# Patient Record
Sex: FEMALE | Race: WHITE | Employment: OTHER | URBAN - NONMETROPOLITAN AREA
[De-identification: names, ages, dates, MRNs, and addresses within clinical notes are randomized per-mention and may not be internally consistent; named-entity substitution may affect disease eponyms.]

---

## 2018-09-24 LAB
AVERAGE GLUCOSE: NORMAL
CHOLESTEROL, TOTAL: 210 MG/DL
CHOLESTEROL/HDL RATIO: 3.8
HBA1C MFR BLD: 6.1 %
HDLC SERPL-MCNC: 55 MG/DL (ref 35–70)
LDL CHOLESTEROL CALCULATED: 135 MG/DL (ref 0–160)
TRIGL SERPL-MCNC: 96 MG/DL
VLDLC SERPL CALC-MCNC: 156 MG/DL

## 2019-09-03 RX ORDER — MULTIVITAMIN
1 TABLET ORAL DAILY
COMMUNITY

## 2019-09-03 RX ORDER — TURMERIC ROOT EXTRACT 500 MG
1 TABLET ORAL DAILY
COMMUNITY

## 2019-09-03 RX ORDER — PIOGLITAZONEHYDROCHLORIDE 30 MG/1
30 TABLET ORAL DAILY
COMMUNITY
End: 2019-09-16 | Stop reason: SDUPTHER

## 2019-09-16 ENCOUNTER — OFFICE VISIT (OUTPATIENT)
Dept: FAMILY MEDICINE CLINIC | Age: 79
End: 2019-09-16
Payer: MEDICARE

## 2019-09-16 VITALS
HEART RATE: 88 BPM | BODY MASS INDEX: 39.34 KG/M2 | SYSTOLIC BLOOD PRESSURE: 128 MMHG | OXYGEN SATURATION: 96 % | HEIGHT: 63 IN | WEIGHT: 222 LBS | DIASTOLIC BLOOD PRESSURE: 74 MMHG

## 2019-09-16 DIAGNOSIS — Z12.39 SCREENING FOR BREAST CANCER: Primary | ICD-10-CM

## 2019-09-16 DIAGNOSIS — Z12.11 SCREEN FOR COLON CANCER: ICD-10-CM

## 2019-09-16 DIAGNOSIS — E11.9 TYPE 2 DIABETES MELLITUS WITHOUT COMPLICATION, WITHOUT LONG-TERM CURRENT USE OF INSULIN (HCC): ICD-10-CM

## 2019-09-16 DIAGNOSIS — M85.89 OSTEOPENIA OF MULTIPLE SITES: ICD-10-CM

## 2019-09-16 DIAGNOSIS — Z23 IMMUNIZATION DUE: ICD-10-CM

## 2019-09-16 DIAGNOSIS — E78.49 OTHER HYPERLIPIDEMIA: ICD-10-CM

## 2019-09-16 LAB
BILIRUBIN, POC: ABNORMAL
BLOOD URINE, POC: ABNORMAL
CLARITY, POC: CLEAR
COLOR, POC: YELLOW
GLUCOSE URINE, POC: ABNORMAL
KETONES, POC: ABNORMAL
LEUKOCYTE EST, POC: ABNORMAL
NITRITE, POC: POSITIVE
PH, POC: 6.5
PROTEIN, POC: ABNORMAL
SPECIFIC GRAVITY, POC: 1.02
UROBILINOGEN, POC: ABNORMAL

## 2019-09-16 PROCEDURE — 81002 URINALYSIS NONAUTO W/O SCOPE: CPT | Performed by: FAMILY MEDICINE

## 2019-09-16 PROCEDURE — 90653 IIV ADJUVANT VACCINE IM: CPT | Performed by: FAMILY MEDICINE

## 2019-09-16 PROCEDURE — 93000 ELECTROCARDIOGRAM COMPLETE: CPT | Performed by: FAMILY MEDICINE

## 2019-09-16 PROCEDURE — G0008 ADMIN INFLUENZA VIRUS VAC: HCPCS | Performed by: FAMILY MEDICINE

## 2019-09-16 PROCEDURE — 99214 OFFICE O/P EST MOD 30 MIN: CPT | Performed by: FAMILY MEDICINE

## 2019-09-16 RX ORDER — PIOGLITAZONEHYDROCHLORIDE 30 MG/1
30 TABLET ORAL DAILY
Qty: 90 TABLET | Refills: 3 | Status: SHIPPED
Start: 2019-09-16 | End: 2020-09-21 | Stop reason: SDUPTHER

## 2019-09-16 SDOH — HEALTH STABILITY: MENTAL HEALTH: HOW OFTEN DO YOU HAVE A DRINK CONTAINING ALCOHOL?: NEVER

## 2019-09-16 ASSESSMENT — ENCOUNTER SYMPTOMS
EYE REDNESS: 0
COUGH: 0
ABDOMINAL PAIN: 0
DIARRHEA: 0
EYES NEGATIVE: 1
PHOTOPHOBIA: 0
WHEEZING: 0
CHEST TIGHTNESS: 0
CONSTIPATION: 0
BLOOD IN STOOL: 0
VOMITING: 0

## 2019-09-16 ASSESSMENT — PATIENT HEALTH QUESTIONNAIRE - PHQ9
SUM OF ALL RESPONSES TO PHQ QUESTIONS 1-9: 0
SUM OF ALL RESPONSES TO PHQ9 QUESTIONS 1 & 2: 0
SUM OF ALL RESPONSES TO PHQ QUESTIONS 1-9: 0
1. LITTLE INTEREST OR PLEASURE IN DOING THINGS: 0
2. FEELING DOWN, DEPRESSED OR HOPELESS: 0

## 2019-09-16 NOTE — PROGRESS NOTES
OFFICE NOTE    9/16/19  Name: Baron Owusu  ZDX:[birth year]   Sex:female   Age:79 y.o. SUBJECTIVE  Chief Complaint   Patient presents with    Annual Exam    Diabetes       HPI Comes in for checkup. Spends 6 mos in [place]. C/o dependent edema, better in cooler weather    Review of Systems   Constitutional: Negative for appetite change, fever and unexpected weight change. HENT: Negative for congestion, ear pain and postnasal drip. Eyes: Negative. Negative for photophobia, redness and visual disturbance. Respiratory: Negative for cough, chest tightness and wheezing. Cardiovascular: Positive for leg swelling. Negative for chest pain and palpitations. Gastrointestinal: Negative for abdominal pain, blood in stool, constipation, diarrhea and vomiting. Endocrine: Negative for cold intolerance, polydipsia and polyuria. Genitourinary: Negative for dysuria and hematuria. Musculoskeletal: Positive for arthralgias. Negative for gait problem and joint swelling. Skin: Negative for rash and wound. Allergic/Immunologic: Negative for environmental allergies and food allergies. Neurological: Negative for dizziness, tremors, weakness and headaches. Hematological: Negative for adenopathy. Does not bruise/bleed easily. Psychiatric/Behavioral: Negative for behavioral problems, confusion, dysphoric mood and sleep disturbance. Current Outpatient Medications:     pioglitazone (ACTOS) 30 MG tablet, Take 1 tablet by mouth daily, Disp: 90 tablet, Rfl: 3    Calcium Carb-Cholecalciferol (CALCIUM + D3) 600-200 MG-UNIT TABS tablet, Take 1 tablet by mouth daily , Disp: , Rfl:     Multiple Vitamin (MULTI-VITAMIN DAILY) TABS, Take 1 tablet by mouth daily, Disp: , Rfl:     aspirin 81 MG tablet, Take 81 mg by mouth daily, Disp: , Rfl:     Turmeric 500 MG TABS, Take 1 tablet by mouth daily , Disp: , Rfl:   No Known Allergies    No past medical history on file. No past surgical history on file.   No

## 2019-09-27 ENCOUNTER — HOSPITAL ENCOUNTER (OUTPATIENT)
Age: 79
Discharge: HOME | End: 2019-09-29
Payer: MEDICARE

## 2019-09-27 DIAGNOSIS — E78.49 OTHER HYPERLIPIDEMIA: ICD-10-CM

## 2019-09-27 DIAGNOSIS — E11.9 TYPE 2 DIABETES MELLITUS WITHOUT COMPLICATION, WITHOUT LONG-TERM CURRENT USE OF INSULIN (HCC): ICD-10-CM

## 2019-09-27 LAB
ALBUMIN SERPL-MCNC: 3.8 G/DL (ref 3.5–5.2)
ALP BLD-CCNC: 104 U/L (ref 35–104)
ALT SERPL-CCNC: 22 U/L (ref 0–32)
ANION GAP SERPL CALCULATED.3IONS-SCNC: 17 MMOL/L (ref 7–16)
AST SERPL-CCNC: 25 U/L (ref 0–31)
BASOPHILS ABSOLUTE: 0.02 E9/L (ref 0–0.2)
BASOPHILS RELATIVE PERCENT: 0.5 % (ref 0–2)
BILIRUB SERPL-MCNC: 0.3 MG/DL (ref 0–1.2)
BUN BLDV-MCNC: 16 MG/DL (ref 8–23)
CALCIUM SERPL-MCNC: 8.9 MG/DL (ref 8.6–10.2)
CHLORIDE BLD-SCNC: 103 MMOL/L (ref 98–107)
CHOLESTEROL, TOTAL: 199 MG/DL (ref 0–199)
CO2: 22 MMOL/L (ref 22–29)
CREAT SERPL-MCNC: 0.8 MG/DL (ref 0.5–1)
CREATININE URINE: 81 MG/DL (ref 29–226)
EOSINOPHILS ABSOLUTE: 0.12 E9/L (ref 0.05–0.5)
EOSINOPHILS RELATIVE PERCENT: 3 % (ref 0–6)
GFR AFRICAN AMERICAN: >60
GFR NON-AFRICAN AMERICAN: >60 ML/MIN/1.73
GLUCOSE BLD-MCNC: 124 MG/DL (ref 74–99)
HCT VFR BLD CALC: 39.7 % (ref 34–48)
HDLC SERPL-MCNC: 53 MG/DL
HEMOGLOBIN: 12.6 G/DL (ref 11.5–15.5)
IMMATURE GRANULOCYTES #: 0.01 E9/L
IMMATURE GRANULOCYTES %: 0.2 % (ref 0–5)
LDL CHOLESTEROL CALCULATED: 128 MG/DL (ref 0–99)
LYMPHOCYTES ABSOLUTE: 1.66 E9/L (ref 1.5–4)
LYMPHOCYTES RELATIVE PERCENT: 40.9 % (ref 20–42)
MCH RBC QN AUTO: 30.5 PG (ref 26–35)
MCHC RBC AUTO-ENTMCNC: 31.7 % (ref 32–34.5)
MCV RBC AUTO: 96.1 FL (ref 80–99.9)
MICROALBUMIN UR-MCNC: <12 MG/L
MICROALBUMIN/CREAT UR-RTO: ABNORMAL (ref 0–30)
MONOCYTES ABSOLUTE: 0.28 E9/L (ref 0.1–0.95)
MONOCYTES RELATIVE PERCENT: 6.9 % (ref 2–12)
NEUTROPHILS ABSOLUTE: 1.97 E9/L (ref 1.8–7.3)
NEUTROPHILS RELATIVE PERCENT: 48.5 % (ref 43–80)
PDW BLD-RTO: 14.3 FL (ref 11.5–15)
PLATELET # BLD: 268 E9/L (ref 130–450)
PMV BLD AUTO: 10.4 FL (ref 7–12)
POTASSIUM SERPL-SCNC: 4.4 MMOL/L (ref 3.5–5)
RBC # BLD: 4.13 E12/L (ref 3.5–5.5)
SODIUM BLD-SCNC: 142 MMOL/L (ref 132–146)
TOTAL PROTEIN: 7.1 G/DL (ref 6.4–8.3)
TRIGL SERPL-MCNC: 91 MG/DL (ref 0–149)
TSH SERPL DL<=0.05 MIU/L-ACNC: 1.68 UIU/ML (ref 0.27–4.2)
VLDLC SERPL CALC-MCNC: 18 MG/DL
WBC # BLD: 4.1 E9/L (ref 4.5–11.5)

## 2019-09-27 PROCEDURE — 36415 COLL VENOUS BLD VENIPUNCTURE: CPT

## 2019-09-27 PROCEDURE — 84443 ASSAY THYROID STIM HORMONE: CPT

## 2019-09-27 PROCEDURE — 85025 COMPLETE CBC W/AUTO DIFF WBC: CPT

## 2019-09-27 PROCEDURE — 82570 ASSAY OF URINE CREATININE: CPT

## 2019-09-27 PROCEDURE — 80061 LIPID PANEL: CPT

## 2019-09-27 PROCEDURE — 80053 COMPREHEN METABOLIC PANEL: CPT

## 2019-09-27 PROCEDURE — 82044 UR ALBUMIN SEMIQUANTITATIVE: CPT

## 2019-09-30 ENCOUNTER — TELEPHONE (OUTPATIENT)
Dept: FAMILY MEDICINE CLINIC | Age: 79
End: 2019-09-30

## 2019-10-01 DIAGNOSIS — Z12.39 SCREENING FOR BREAST CANCER: ICD-10-CM

## 2019-10-10 ENCOUNTER — TELEPHONE (OUTPATIENT)
Dept: FAMILY MEDICINE CLINIC | Age: 79
End: 2019-10-10

## 2020-09-21 ENCOUNTER — OFFICE VISIT (OUTPATIENT)
Dept: FAMILY MEDICINE CLINIC | Age: 80
End: 2020-09-21
Payer: MEDICARE

## 2020-09-21 VITALS
HEIGHT: 63 IN | HEART RATE: 98 BPM | BODY MASS INDEX: 38.66 KG/M2 | OXYGEN SATURATION: 97 % | SYSTOLIC BLOOD PRESSURE: 152 MMHG | WEIGHT: 218.2 LBS | DIASTOLIC BLOOD PRESSURE: 88 MMHG | TEMPERATURE: 97.5 F

## 2020-09-21 PROBLEM — E66.01 MORBIDLY OBESE (HCC): Status: ACTIVE | Noted: 2020-09-21

## 2020-09-21 PROCEDURE — 99214 OFFICE O/P EST MOD 30 MIN: CPT | Performed by: FAMILY MEDICINE

## 2020-09-21 PROCEDURE — G8417 CALC BMI ABV UP PARAM F/U: HCPCS | Performed by: FAMILY MEDICINE

## 2020-09-21 PROCEDURE — 93000 ELECTROCARDIOGRAM COMPLETE: CPT | Performed by: FAMILY MEDICINE

## 2020-09-21 PROCEDURE — 1090F PRES/ABSN URINE INCON ASSESS: CPT | Performed by: FAMILY MEDICINE

## 2020-09-21 PROCEDURE — 4040F PNEUMOC VAC/ADMIN/RCVD: CPT | Performed by: FAMILY MEDICINE

## 2020-09-21 PROCEDURE — 1036F TOBACCO NON-USER: CPT | Performed by: FAMILY MEDICINE

## 2020-09-21 PROCEDURE — G8400 PT W/DXA NO RESULTS DOC: HCPCS | Performed by: FAMILY MEDICINE

## 2020-09-21 PROCEDURE — G8427 DOCREV CUR MEDS BY ELIG CLIN: HCPCS | Performed by: FAMILY MEDICINE

## 2020-09-21 PROCEDURE — 90670 PCV13 VACCINE IM: CPT | Performed by: FAMILY MEDICINE

## 2020-09-21 PROCEDURE — G0009 ADMIN PNEUMOCOCCAL VACCINE: HCPCS | Performed by: FAMILY MEDICINE

## 2020-09-21 PROCEDURE — 1123F ACP DISCUSS/DSCN MKR DOCD: CPT | Performed by: FAMILY MEDICINE

## 2020-09-21 RX ORDER — PIOGLITAZONEHYDROCHLORIDE 30 MG/1
30 TABLET ORAL DAILY
Qty: 90 TABLET | Refills: 3 | Status: SHIPPED
Start: 2020-09-21 | End: 2021-02-23 | Stop reason: SDUPTHER

## 2020-09-21 ASSESSMENT — ENCOUNTER SYMPTOMS
ABDOMINAL PAIN: 0
DIARRHEA: 0
EYE REDNESS: 0
BLOOD IN STOOL: 0
SINUS PAIN: 0
WHEEZING: 0
COUGH: 0
EYES NEGATIVE: 1
CHEST TIGHTNESS: 0
PHOTOPHOBIA: 0
CONSTIPATION: 0
VOMITING: 0
SHORTNESS OF BREATH: 0

## 2020-09-21 ASSESSMENT — PATIENT HEALTH QUESTIONNAIRE - PHQ9
SUM OF ALL RESPONSES TO PHQ9 QUESTIONS 1 & 2: 0
1. LITTLE INTEREST OR PLEASURE IN DOING THINGS: 0
SUM OF ALL RESPONSES TO PHQ QUESTIONS 1-9: 0
SUM OF ALL RESPONSES TO PHQ QUESTIONS 1-9: 0
2. FEELING DOWN, DEPRESSED OR HOPELESS: 0

## 2020-09-21 NOTE — LETTER
[facility] [address]  [address] [place] [address]  Phone: [phone]  Fax: [phone]    Onofre Bonilla MD        2020     [facility] [address] [place] [address]      Dear Hector Rouse:    Below are the results from your recent visit:    Resulted Orders   [address]                                                [phone]                                                 Mammography Report                                                    Signed        Patient: Jesus Espinoza                                                                MR#: [id number]    [id number]            : 1940                                                                [de-identified]                Age/Sex: 79 / F                                                                Admit Date: 10/05/20                Loc: [facility]                                                                                    Attending Dr: Marcos Merritt MD        Ordering Physician: Devang Disla MD     Date of Service: 10/05/20    Procedure(s): MM tomosynthesis screening BI    Accession Number(s): [id number]        cc:             HISTORY:     Last mammogram was performed 1 year ago. Reason for exam: screening, asymptomatic. MM Tomosynthesis Screening BI           TECHNIQUE:     Bilateral 3D CC and 3D MLO view(s) were taken. FINDINGS:     Prior study comparison: 2019, bilateral MM tomosynthesis screening BI performed at     Replaced by [facility]. 2018, bilateral MM tomosynthesis screening BI performed at [place] [address]. No new mass lesion, suspicious calcifications, skin thickening, or area of architectural distortion     is noted. No significant     changes when compared with prior studies. There are scattered fibroglandular densities (Composition Category B, type 2). IMPRESSION:     BI-RADS 1: Negative. No mammographic evidence of malignancy. No significant changes when compared to prior exam.  Yearly mammograms are recommended (or as     clinically indicated). RECOMMENDATION:     Routine screening mammogram in 1 year. Dictated ByAdali Guerrero MD   DD/DT: 10/05/20 125                 Signed By: Guerda Campuzano MD 10/05/20 1245                : TGF     The test results show that your current treatment is working. Please continue your current medication and plan. We recommend that you repeat the above test(s) in 1 year. If you have any questions or concerns, please don't hesitate to call.     Sincerely,        Greer Bah MD

## 2020-09-21 NOTE — PROGRESS NOTES
Disp: , Rfl:   No Known Allergies    No past medical history on file. No past surgical history on file. No family history on file. Social History     Tobacco History     Smoking Status  Never Smoker    Smokeless Tobacco Use  Never Used          Alcohol History     Alcohol Use Status  Never          Drug Use     Drug Use Status  Never          Sexual Activity     Sexually Active  Not Currently Partners  Male Birth Control/Protection  Post-menopausal              OBJECTIVE  Vitals:    09/21/20 0823   BP: (!) 152/88   Site: Left Upper Arm   Position: Sitting   Pulse: 98   Temp: 97.5 °F (36.4 °C)   TempSrc: Temporal   SpO2: 97%   Weight: 218 lb 3.2 oz (99 kg)   Height: 5' 3\" (1.6 m)        Body mass index is 38.65 kg/m². Patient is obese    Orders Placed This Encounter   Procedures    JANETT GARETH DIGITAL SCREEN BILATERAL PER PROTOCOL     Further imaging can be completed per [address] protocol     Standing Status:   Future     Standing Expiration Date:   11/16/2021    DEXA BONE DENSITY 2 SITES     Standing Status:   Future     Standing Expiration Date:   9/21/2021    PREVNAR 13 IM (Pneumococcal conjugate vaccine 13-valent)    CBC Auto Differential     Standing Status:   Future     Standing Expiration Date:   9/16/2021    Comprehensive Metabolic Panel     Standing Status:   Future     Standing Expiration Date:   9/16/2021    Lipid Panel     Standing Status:   Future     Standing Expiration Date:   9/16/2021     Order Specific Question:   Is Patient Fasting?/# of Hours     Answer:   8    TSH without Reflex     Standing Status:   Future     Standing Expiration Date:   9/16/2021    Urinalysis     Standing Status:   Future     Standing Expiration Date:   9/16/2021     Order Specific Question:   SPECIFY(EX-CATH,MIDSTREAM,CYSTO,ETC)?      Answer:   midstream    Hemoglobin A1C     Standing Status:   Future     Standing Expiration Date:   9/16/2021    Microalbumin / Creatinine Urine Ratio     Standing Status:   Future     Standing Expiration Date:   9/16/2021    EKG 12 lead     Order Specific Question:   Reason for Exam?     Answer:   Hypertension        EXAM   Physical Exam  Vitals signs and nursing note reviewed. Constitutional:       Appearance: Normal appearance. She is well-developed. She is obese. HENT:      Right Ear: Tympanic membrane, ear canal and external ear normal.      Left Ear: Tympanic membrane, ear canal and external ear normal.      Nose: Nose normal. No congestion. Mouth/Throat:      Pharynx: Oropharynx is clear. Eyes:      General: No scleral icterus. Conjunctiva/sclera: Conjunctivae normal.      Pupils: Pupils are equal, round, and reactive to light. Neck:      Musculoskeletal: Normal range of motion and neck supple. Thyroid: No thyroid mass or thyromegaly. Vascular: No carotid bruit or JVD. Trachea: Trachea normal.   Cardiovascular:      Rate and Rhythm: Normal rate and regular rhythm. Pulses: Normal pulses. Heart sounds: Normal heart sounds. No murmur. No gallop. Pulmonary:      Effort: Pulmonary effort is normal.      Breath sounds: Normal breath sounds. No wheezing, rhonchi or rales. Abdominal:      General: Bowel sounds are normal. There is no distension. Palpations: Abdomen is soft. There is no mass. Tenderness: There is no abdominal tenderness. There is no guarding. Hernia: No hernia is present. Musculoskeletal: Normal range of motion. General: No swelling or tenderness. Right lower leg: Edema present. Left lower leg: Edema present. Lymphadenopathy:      Cervical: No cervical adenopathy. Skin:     General: Skin is warm and dry. Coloration: Skin is not jaundiced. Findings: No bruising or rash. Neurological:      General: No focal deficit present. Mental Status: She is alert and oriented to person, place, and time. Motor: No abnormal muscle tone.    Psychiatric:         Mood and Affect: Mood normal.         Behavior: Behavior normal.           Elfego was seen today for diabetes. Diagnoses and all orders for this visit:    Encounter for screening mammogram for malignant neoplasm of breast  -     JANETT GARETH DIGITAL SCREEN BILATERAL PER PROTOCOL; Future    Other hyperlipidemia  -     CBC Auto Differential; Future  -     Comprehensive Metabolic Panel; Future  -     Lipid Panel; Future  -     TSH without Reflex; Future  -     Urinalysis; Future  -     Hemoglobin A1C; Future  -     Microalbumin / Creatinine Urine Ratio; Future  -     EKG 12 lead    Type 2 diabetes mellitus without complication, without long-term current use of insulin (HCC)  -     CBC Auto Differential; Future  -     Comprehensive Metabolic Panel; Future  -     Lipid Panel; Future  -     TSH without Reflex; Future  -     Urinalysis; Future  -     Hemoglobin A1C; Future  -     Microalbumin / Creatinine Urine Ratio; Future  -     EKG 12 lead  -     pioglitazone (ACTOS) 30 MG tablet; Take 1 tablet by mouth daily    Encounter for general adult medical examination without abnormal findings  -     CBC Auto Differential; Future  -     Comprehensive Metabolic Panel; Future  -     Lipid Panel; Future  -     TSH without Reflex; Future  -     Urinalysis; Future  -     Hemoglobin A1C; Future  -     Microalbumin / Creatinine Urine Ratio; Future  -     JANETT GARETH DIGITAL SCREEN BILATERAL PER PROTOCOL; Future  -     EKG 12 lead    Age-related osteoporosis without current pathological fracture  -     DEXA BONE DENSITY 2 SITES; Future    Morbidly obese ([address] Active. Watches diet some. Does not use walking aid. Other orders  -     PREVNAR 13 IM (Pneumococcal conjugate vaccine 13-valent)    Actose could be replaced with injectiable weekly shot. Will see what BW shows. Return in about 1 year (around 9/21/2021).     Electronically signed by Mason Wells MD on 9/21/20 at 8:50 AM EDT    I have personally reviewed and updated the chief complaint, HPI, Past Medical, Family and Social History, as well as the above Review of Systems.

## 2020-09-25 ENCOUNTER — HOSPITAL ENCOUNTER (OUTPATIENT)
Age: 80
Discharge: HOME | End: 2020-09-27
Payer: MEDICARE

## 2020-09-25 LAB
ALBUMIN SERPL-MCNC: 3.5 G/DL (ref 3.5–5.2)
ALP BLD-CCNC: 106 U/L (ref 35–104)
ALT SERPL-CCNC: 23 U/L (ref 0–32)
ANION GAP SERPL CALCULATED.3IONS-SCNC: 20 MMOL/L (ref 7–16)
AST SERPL-CCNC: 34 U/L (ref 0–31)
BACTERIA: ABNORMAL /HPF
BASOPHILS ABSOLUTE: 0.04 E9/L (ref 0–0.2)
BASOPHILS RELATIVE PERCENT: 1 % (ref 0–2)
BILIRUB SERPL-MCNC: 0.2 MG/DL (ref 0–1.2)
BILIRUBIN URINE: NEGATIVE
BLOOD, URINE: NEGATIVE
BUN BLDV-MCNC: 20 MG/DL (ref 8–23)
CALCIUM SERPL-MCNC: 9.1 MG/DL (ref 8.6–10.2)
CHLORIDE BLD-SCNC: 103 MMOL/L (ref 98–107)
CHOLESTEROL, TOTAL: 201 MG/DL (ref 0–199)
CLARITY: ABNORMAL
CO2: 19 MMOL/L (ref 22–29)
COLOR: YELLOW
CREAT SERPL-MCNC: 0.9 MG/DL (ref 0.5–1)
CREATININE URINE: 92 MG/DL (ref 29–226)
EOSINOPHILS ABSOLUTE: 0.12 E9/L (ref 0.05–0.5)
EOSINOPHILS RELATIVE PERCENT: 2.9 % (ref 0–6)
GFR AFRICAN AMERICAN: >60
GFR NON-AFRICAN AMERICAN: >60 ML/MIN/1.73
GLUCOSE BLD-MCNC: 126 MG/DL (ref 74–99)
GLUCOSE URINE: NEGATIVE MG/DL
HBA1C MFR BLD: 6.3 % (ref 4–5.6)
HCT VFR BLD CALC: 39.6 % (ref 34–48)
HDLC SERPL-MCNC: 53 MG/DL
HEMOGLOBIN: 12.5 G/DL (ref 11.5–15.5)
IMMATURE GRANULOCYTES #: 0.01 E9/L
IMMATURE GRANULOCYTES %: 0.2 % (ref 0–5)
KETONES, URINE: NEGATIVE MG/DL
LDL CHOLESTEROL CALCULATED: 123 MG/DL (ref 0–99)
LEUKOCYTE ESTERASE, URINE: ABNORMAL
LYMPHOCYTES ABSOLUTE: 1.75 E9/L (ref 1.5–4)
LYMPHOCYTES RELATIVE PERCENT: 43 % (ref 20–42)
MCH RBC QN AUTO: 30.7 PG (ref 26–35)
MCHC RBC AUTO-ENTMCNC: 31.6 % (ref 32–34.5)
MCV RBC AUTO: 97.3 FL (ref 80–99.9)
MICROALBUMIN UR-MCNC: 13.1 MG/L
MICROALBUMIN/CREAT UR-RTO: 14.2 (ref 0–30)
MONOCYTES ABSOLUTE: 0.34 E9/L (ref 0.1–0.95)
MONOCYTES RELATIVE PERCENT: 8.4 % (ref 2–12)
NEUTROPHILS ABSOLUTE: 1.81 E9/L (ref 1.8–7.3)
NEUTROPHILS RELATIVE PERCENT: 44.5 % (ref 43–80)
NITRITE, URINE: NEGATIVE
PDW BLD-RTO: 14.5 FL (ref 11.5–15)
PH UA: 6 (ref 5–9)
PLATELET # BLD: 287 E9/L (ref 130–450)
PMV BLD AUTO: 10.7 FL (ref 7–12)
POTASSIUM SERPL-SCNC: 5.3 MMOL/L (ref 3.5–5)
PROTEIN UA: NEGATIVE MG/DL
RBC # BLD: 4.07 E12/L (ref 3.5–5.5)
RBC UA: ABNORMAL /HPF (ref 0–2)
SODIUM BLD-SCNC: 142 MMOL/L (ref 132–146)
SPECIFIC GRAVITY UA: 1.01 (ref 1–1.03)
TOTAL PROTEIN: 6.9 G/DL (ref 6.4–8.3)
TRIGL SERPL-MCNC: 126 MG/DL (ref 0–149)
TSH SERPL DL<=0.05 MIU/L-ACNC: 2.17 UIU/ML (ref 0.27–4.2)
UROBILINOGEN, URINE: 0.2 E.U./DL
VLDLC SERPL CALC-MCNC: 25 MG/DL
WBC # BLD: 4.1 E9/L (ref 4.5–11.5)
WBC UA: ABNORMAL /HPF (ref 0–5)

## 2020-09-25 PROCEDURE — 36415 COLL VENOUS BLD VENIPUNCTURE: CPT

## 2020-09-25 PROCEDURE — 84443 ASSAY THYROID STIM HORMONE: CPT

## 2020-09-25 PROCEDURE — 81001 URINALYSIS AUTO W/SCOPE: CPT

## 2020-09-25 PROCEDURE — 80053 COMPREHEN METABOLIC PANEL: CPT

## 2020-09-25 PROCEDURE — 82570 ASSAY OF URINE CREATININE: CPT

## 2020-09-25 PROCEDURE — 80061 LIPID PANEL: CPT

## 2020-09-25 PROCEDURE — 83036 HEMOGLOBIN GLYCOSYLATED A1C: CPT

## 2020-09-25 PROCEDURE — 82044 UR ALBUMIN SEMIQUANTITATIVE: CPT

## 2020-09-25 PROCEDURE — 85025 COMPLETE CBC W/AUTO DIFF WBC: CPT

## 2021-02-23 DIAGNOSIS — E11.9 TYPE 2 DIABETES MELLITUS WITHOUT COMPLICATION, WITHOUT LONG-TERM CURRENT USE OF INSULIN (HCC): ICD-10-CM

## 2021-02-23 RX ORDER — PIOGLITAZONEHYDROCHLORIDE 30 MG/1
30 TABLET ORAL DAILY
Qty: 90 TABLET | Refills: 3 | Status: SHIPPED
Start: 2021-02-23 | End: 2021-03-15 | Stop reason: SDUPTHER

## 2021-03-15 DIAGNOSIS — E11.9 TYPE 2 DIABETES MELLITUS WITHOUT COMPLICATION, WITHOUT LONG-TERM CURRENT USE OF INSULIN (HCC): ICD-10-CM

## 2021-03-15 RX ORDER — PIOGLITAZONEHYDROCHLORIDE 30 MG/1
30 TABLET ORAL DAILY
Qty: 90 TABLET | Refills: 3 | Status: SHIPPED
Start: 2021-03-15 | End: 2022-04-18

## 2021-03-15 NOTE — TELEPHONE ENCOUNTER
Last Appointment:  9/21/2020  Future Appointments   Date Time Provider Naif Lancaster   9/20/2021  8:00 AM Diana Flanagan  [address] [place] [address]

## 2021-09-30 ENCOUNTER — OFFICE VISIT (OUTPATIENT)
Dept: FAMILY MEDICINE CLINIC | Age: 81
End: 2021-09-30
Payer: MEDICARE

## 2021-09-30 VITALS
HEART RATE: 89 BPM | SYSTOLIC BLOOD PRESSURE: 158 MMHG | BODY MASS INDEX: 39.3 KG/M2 | WEIGHT: 221.8 LBS | TEMPERATURE: 97.3 F | OXYGEN SATURATION: 96 % | HEIGHT: 63 IN | DIASTOLIC BLOOD PRESSURE: 76 MMHG

## 2021-09-30 DIAGNOSIS — M85.89 OSTEOPENIA OF MULTIPLE SITES: ICD-10-CM

## 2021-09-30 DIAGNOSIS — E78.49 OTHER HYPERLIPIDEMIA: ICD-10-CM

## 2021-09-30 DIAGNOSIS — E11.9 TYPE 2 DIABETES MELLITUS WITHOUT COMPLICATION, WITHOUT LONG-TERM CURRENT USE OF INSULIN (HCC): Primary | ICD-10-CM

## 2021-09-30 DIAGNOSIS — E66.01 SEVERE OBESITY (BMI 35.0-35.9 WITH COMORBIDITY) (HCC): ICD-10-CM

## 2021-09-30 PROCEDURE — G8399 PT W/DXA RESULTS DOCUMENT: HCPCS | Performed by: FAMILY MEDICINE

## 2021-09-30 PROCEDURE — G8417 CALC BMI ABV UP PARAM F/U: HCPCS | Performed by: FAMILY MEDICINE

## 2021-09-30 PROCEDURE — 1036F TOBACCO NON-USER: CPT | Performed by: FAMILY MEDICINE

## 2021-09-30 PROCEDURE — 1090F PRES/ABSN URINE INCON ASSESS: CPT | Performed by: FAMILY MEDICINE

## 2021-09-30 PROCEDURE — G8427 DOCREV CUR MEDS BY ELIG CLIN: HCPCS | Performed by: FAMILY MEDICINE

## 2021-09-30 PROCEDURE — 93000 ELECTROCARDIOGRAM COMPLETE: CPT | Performed by: FAMILY MEDICINE

## 2021-09-30 PROCEDURE — 99214 OFFICE O/P EST MOD 30 MIN: CPT | Performed by: FAMILY MEDICINE

## 2021-09-30 PROCEDURE — 4040F PNEUMOC VAC/ADMIN/RCVD: CPT | Performed by: FAMILY MEDICINE

## 2021-09-30 PROCEDURE — 1123F ACP DISCUSS/DSCN MKR DOCD: CPT | Performed by: FAMILY MEDICINE

## 2021-09-30 ASSESSMENT — ENCOUNTER SYMPTOMS
DIARRHEA: 0
EYE REDNESS: 0
BLOOD IN STOOL: 0
VOMITING: 0
PHOTOPHOBIA: 0
ABDOMINAL PAIN: 0
WHEEZING: 0
EYES NEGATIVE: 1
COUGH: 0
SHORTNESS OF BREATH: 0
CHEST TIGHTNESS: 0
CONSTIPATION: 0

## 2021-09-30 ASSESSMENT — PATIENT HEALTH QUESTIONNAIRE - PHQ9
2. FEELING DOWN, DEPRESSED OR HOPELESS: 0
SUM OF ALL RESPONSES TO PHQ QUESTIONS 1-9: 0
1. LITTLE INTEREST OR PLEASURE IN DOING THINGS: 0
SUM OF ALL RESPONSES TO PHQ9 QUESTIONS 1 & 2: 0
SUM OF ALL RESPONSES TO PHQ QUESTIONS 1-9: 0
SUM OF ALL RESPONSES TO PHQ QUESTIONS 1-9: 0

## 2021-09-30 NOTE — PROGRESS NOTES
OFFICE NOTE    9/30/21  Name: Luke Sampson  LIL:[birth year]   Sex:female   Age:81 y.o. SUBJECTIVE  Chief Complaint   Patient presents with    Diabetes       HPI comes in for checkup and refills. Says home Bps are always normal    Review of Systems   Constitutional: Positive for fatigue. Negative for appetite change, fever and unexpected weight change. HENT: Negative for congestion, ear pain and postnasal drip. Eyes: Negative. Negative for photophobia, redness and visual disturbance. Respiratory: Negative for cough, chest tightness, shortness of breath and wheezing. Cardiovascular: Negative for chest pain and palpitations. Gastrointestinal: Negative for abdominal pain, blood in stool, constipation, diarrhea and vomiting. Endocrine: Negative for cold intolerance, polydipsia and polyuria. Genitourinary: Positive for urgency. Negative for dysuria and hematuria. Musculoskeletal: Positive for arthralgias. Negative for gait problem and joint swelling. Skin: Negative for rash and wound. Allergic/Immunologic: Negative for environmental allergies and food allergies. Neurological: Negative for dizziness, tremors, seizures, weakness, numbness and headaches. Hematological: Negative for adenopathy. Does not bruise/bleed easily. Psychiatric/Behavioral: Negative for behavioral problems, confusion, dysphoric mood and sleep disturbance. The patient is not nervous/anxious. All other systems reviewed and are negative.            Current Outpatient Medications:     pioglitazone (ACTOS) 30 MG tablet, Take 1 tablet by mouth daily, Disp: 90 tablet, Rfl: 3    Calcium Carb-Cholecalciferol (CALCIUM + D3) 600-200 MG-UNIT TABS tablet, Take 1 tablet by mouth daily , Disp: , Rfl:     Multiple Vitamin (MULTI-VITAMIN DAILY) TABS, Take 1 tablet by mouth daily, Disp: , Rfl:     aspirin 81 MG tablet, Take 81 mg by mouth daily, Disp: , Rfl:     Turmeric 500 MG TABS, Take 1 tablet by mouth daily , Disp: , Rfl:   No Known Allergies    No past medical history on file. No past surgical history on file. No family history on file. Social History     Tobacco History     Smoking Status  Never Smoker    Smokeless Tobacco Use  Never Used          Alcohol History     Alcohol Use Status  Never          Drug Use     Drug Use Status  Never          Sexual Activity     Sexually Active  Not Currently Partners  Male Birth Control/Protection  Post-menopausal                OBJECTIVE  Vitals:    09/30/21 0949   BP: (!) 158/76   Site: Right Upper Arm   Position: Sitting   Pulse: 89   Temp: 97.3 °F (36.3 °C)   TempSrc: Temporal   SpO2: 96%   Weight: 221 lb 12.8 oz (100.6 kg)   Height: 5' 3\" (1.6 m)        Body mass index is 39.29 kg/m². Orders Placed This Encounter   Procedures    INFLUENZA, QUADV, ADJUVANTED, 72 YRS =, IM, PF, PREFILL SYR, 0.5ML (FLUAD)    CBC Auto Differential     Standing Status:   Future     Standing Expiration Date:   9/30/2022    Comprehensive Metabolic Panel     Standing Status:   Future     Standing Expiration Date:   9/30/2022    Lipid Panel     Standing Status:   Future     Standing Expiration Date:   9/30/2022     Order Specific Question:   Is Patient Fasting?/# of Hours     Answer:   fasting    TSH without Reflex     Standing Status:   Future     Standing Expiration Date:   9/30/2022    URINALYSIS     Standing Status:   Future     Standing Expiration Date:   9/30/2022    Microalbumin / Creatinine Urine Ratio     Standing Status:   Future     Standing Expiration Date:   9/30/2022    Hemoglobin A1C     Standing Status:   Future     Standing Expiration Date:   9/30/2022    EKG 12 Lead     Standing Status:   Future     Number of Occurrences:   1     Standing Expiration Date:   9/30/2022     Order Specific Question:   Reason for Exam?     Answer:   Hypertension        EXAM   Physical Exam  Vitals and nursing note reviewed. Constitutional:       Appearance: Normal appearance.  She is for diabetes. Diagnoses and all orders for this visit:    Type 2 diabetes mellitus without complication, without long-term current use of insulin ([facility])  -     CBC Auto Differential; Future  -     Comprehensive Metabolic Panel; Future  -     Lipid Panel; Future  -     TSH without Reflex; Future  -     EKG 12 Lead; Future  -     URINALYSIS; Future  -     Microalbumin / Creatinine Urine Ratio; Future  -     Hemoglobin A1C; Future  -     EKG 12 Lead  Historically control has been good, no changes made  Osteopenia of multiple sites  On Ca++ and vitamin D. Last DEXA 10/20  Other hyperlipidemia  -     Lipid Panel; Future  -     TSH without Reflex; Future  -     EKG 12 Lead; Future  -     EKG 12 Lead  Diabetic is not on statin  Severe obesity (BMI 35.0-35.9 with comorbidity) ([facility])  BMI now 39. Will see where we stand with BW  Other orders  -     INFLUENZA, QUADV, ADJUVANTED, 65 YRS =, IM, PF, PREFILL SYR, 0.5ML (FLUAD)      Hypertension. Says home readings are good. Will call in 6-8 readings    No follow-ups on file.     Electronically signed by Hanna Tom MD on 9/30/21 at 10:25 AM EDT

## 2021-10-01 PROCEDURE — G0008 ADMIN INFLUENZA VIRUS VAC: HCPCS | Performed by: FAMILY MEDICINE

## 2021-10-01 PROCEDURE — 90694 VACC AIIV4 NO PRSRV 0.5ML IM: CPT | Performed by: FAMILY MEDICINE

## 2022-04-18 DIAGNOSIS — E11.9 TYPE 2 DIABETES MELLITUS WITHOUT COMPLICATION, WITHOUT LONG-TERM CURRENT USE OF INSULIN (HCC): ICD-10-CM

## 2022-04-18 RX ORDER — PIOGLITAZONEHYDROCHLORIDE 30 MG/1
TABLET ORAL
Qty: 90 TABLET | Refills: 3 | Status: SHIPPED | OUTPATIENT
Start: 2022-04-18

## 2022-04-18 NOTE — TELEPHONE ENCOUNTER
Last Appointment:  9/30/2021  Future Appointments   Date Time Provider Naif Lancaster   9/27/2022  8:00 AM Tang Malcolm  W [address] [place] [address]

## 2022-05-27 ENCOUNTER — TELEPHONE (OUTPATIENT)
Dept: FAMILY MEDICINE CLINIC | Age: 82
End: 2022-05-27

## 2022-05-27 RX ORDER — TIZANIDINE 2 MG/1
2 TABLET ORAL NIGHTLY PRN
Qty: 10 TABLET | Refills: 0 | Status: SHIPPED
Start: 2022-05-27 | End: 2022-05-31 | Stop reason: SDUPTHER

## 2022-05-27 NOTE — TELEPHONE ENCOUNTER
Patient called in asking if she can have a muscle relaxer for her back? She would like it sent to [facility] in [facility].

## 2022-05-31 RX ORDER — TIZANIDINE 2 MG/1
2 TABLET ORAL NIGHTLY PRN
Qty: 10 TABLET | Refills: 0 | Status: SHIPPED
Start: 2022-05-31 | End: 2022-05-31

## 2022-05-31 RX ORDER — TIZANIDINE 2 MG/1
2 TABLET ORAL NIGHTLY PRN
Qty: 10 TABLET | Refills: 0 | Status: SHIPPED | OUTPATIENT
Start: 2022-05-31

## 2022-05-31 NOTE — TELEPHONE ENCOUNTER
Sent To [facility] this time. Software tried  to switch it to [employer] again.  Got it to go through second try

## 2022-06-03 ENCOUNTER — OFFICE VISIT (OUTPATIENT)
Dept: FAMILY MEDICINE CLINIC | Age: 82
End: 2022-06-03
Payer: MEDICARE

## 2022-06-03 VITALS
RESPIRATION RATE: 18 BRPM | SYSTOLIC BLOOD PRESSURE: 136 MMHG | HEIGHT: 63 IN | DIASTOLIC BLOOD PRESSURE: 84 MMHG | WEIGHT: 228 LBS | HEART RATE: 97 BPM | TEMPERATURE: 96.8 F | BODY MASS INDEX: 40.4 KG/M2 | OXYGEN SATURATION: 96 %

## 2022-06-03 DIAGNOSIS — M54.42 ACUTE LEFT-SIDED LOW BACK PAIN WITH LEFT-SIDED SCIATICA: Primary | ICD-10-CM

## 2022-06-03 PROBLEM — N18.30 CHRONIC RENAL DISEASE, STAGE III (HCC): Status: ACTIVE | Noted: 2022-06-03

## 2022-06-03 PROCEDURE — G8427 DOCREV CUR MEDS BY ELIG CLIN: HCPCS | Performed by: NURSE PRACTITIONER

## 2022-06-03 PROCEDURE — 1036F TOBACCO NON-USER: CPT | Performed by: NURSE PRACTITIONER

## 2022-06-03 PROCEDURE — 1123F ACP DISCUSS/DSCN MKR DOCD: CPT | Performed by: NURSE PRACTITIONER

## 2022-06-03 PROCEDURE — 99214 OFFICE O/P EST MOD 30 MIN: CPT | Performed by: NURSE PRACTITIONER

## 2022-06-03 PROCEDURE — G8399 PT W/DXA RESULTS DOCUMENT: HCPCS | Performed by: NURSE PRACTITIONER

## 2022-06-03 PROCEDURE — 96372 THER/PROPH/DIAG INJ SC/IM: CPT | Performed by: NURSE PRACTITIONER

## 2022-06-03 PROCEDURE — G8417 CALC BMI ABV UP PARAM F/U: HCPCS | Performed by: NURSE PRACTITIONER

## 2022-06-03 PROCEDURE — 1090F PRES/ABSN URINE INCON ASSESS: CPT | Performed by: NURSE PRACTITIONER

## 2022-06-03 RX ORDER — TRIAMCINOLONE ACETONIDE 40 MG/ML
40 INJECTION, SUSPENSION INTRA-ARTICULAR; INTRAMUSCULAR ONCE
Status: COMPLETED | OUTPATIENT
Start: 2022-06-03 | End: 2022-06-03

## 2022-06-03 RX ORDER — METHYLPREDNISOLONE 4 MG/1
TABLET ORAL
Qty: 1 KIT | Refills: 0 | Status: SHIPPED
Start: 2022-06-03 | End: 2022-07-12

## 2022-06-03 RX ADMIN — TRIAMCINOLONE ACETONIDE 40 MG: 40 INJECTION, SUSPENSION INTRA-ARTICULAR; INTRAMUSCULAR at 09:14

## 2022-06-03 NOTE — PROGRESS NOTES
6/3/22  Herminia Pittman : 1940 Sex: female  Age 81 y.o. Subjective:  Chief Complaint   Patient presents with    Lower Back Pain     patient states that her sciatica has acted up recenty. .. states that it has been awhile. . she was sitting on bleachers for along period of time. ..left side of leg        HPI:   Herminia Pittman , 81 y.o. female presents to the clinic for evaluation of lower back pain x 10 days. There patient also reports associated radiating left leg pain. The patient denies injury and denies history of back problems. Pt states the pain is worse with movement and improves with rest. The patient has taken Zanaflex for symptoms. The patient reports unchanged symptoms over time. Pt denies bowel/bladder incontinence, loss of sensation or strength of extremities, and hematuria. The patient also denies headache, fever, chest pain, abdominal pain, shortness of breath, and nausea / vomiting / diarrhea. ROS:   Unless otherwise stated in this report the patient's positive and negative responses for review of systems for constitutional, eyes, ENT, cardiovascular, respiratory, gastrointestinal, neurological, , musculoskeletal, and integument systems and related systems to the presenting problem are either stated in the history of present illness or were not pertinent or were negative for the symptoms and/or complaints related to the presenting medical problem. Positives and pertinent negatives as per HPI. All others reviewed and are negative. PMH:   History reviewed. No pertinent past medical history. History reviewed. No pertinent surgical history. History reviewed. No pertinent family history.     Medications:     Current Outpatient Medications:     methylPREDNISolone (MEDROL DOSEPACK) 4 MG tablet, Take by mouth., Disp: 1 kit, Rfl: 0    tiZANidine (ZANAFLEX) 2 MG tablet, Take 1 tablet by mouth nightly as needed (back pain), Disp: 10 tablet, Rfl: 0    pioglitazone (ACTOS) 30 MG tablet, TAKE 1 TABLET DAILY, Disp: 90 tablet, Rfl: 3    Calcium Carb-Cholecalciferol (CALCIUM + D3) 600-200 MG-UNIT TABS tablet, Take 1 tablet by mouth daily , Disp: , Rfl:     Multiple Vitamin (MULTI-VITAMIN DAILY) TABS, Take 1 tablet by mouth daily, Disp: , Rfl:     aspirin 81 MG tablet, Take 81 mg by mouth daily, Disp: , Rfl:     Turmeric 500 MG TABS, Take 1 tablet by mouth daily , Disp: , Rfl:     Allergies:   No Known Allergies    Social History:     Social History     Tobacco Use    Smoking status: Never Smoker    Smokeless tobacco: Never Used   Vaping Use    Vaping Use: Never used   Substance Use Topics    Alcohol use: Never    Drug use: Never       Patient lives at home. Physical Exam:     Vitals:    06/03/22 0852   BP: 136/84   Pulse: 97   Resp: 18   Temp: 96.8 °F (36 °C)   SpO2: 96%   Weight: 228 lb (103.4 kg)   Height: 5' 3\" (1.6 m)       Physical Exam (PE)   Constitutional: Alert, development consistent with age. HENT:      Head: Normocephalic. Right Ear: External ear normal.      Left Ear: External ear normal.      Nose: Normal.      Mouth/Throat:     Mouth: Mucous membranes are moist.      Pharynx: Oropharynx is clear. Eyes: Pupils: Pupils are equal, round, and reactive to light. Neck: Normal ROM. Supple. Cardiovascular: Heart RRR without pathologic murmurs or gallops. Pulmonary: Respiratory effort normal.  Normal breath sounds. Abdomen: Soft, nontender, normal bowel sounds. Back: Tenderness: TTP over left SIJ with no appreciable midline tenderness. Swelling: No edema. Range of Motion: Decreased lateral and front to back ROM due to pain. CVA Tenderness: No CVA tenderness bilaterally. Straight leg raising:  Positive            Gaenslen's Test: Negative            Skin: No bruising, redness, abrasions, or rashes. Distal Function:              Motor deficit: Strength 5/5 in LE's bilaterally.               Sensory deficit: Distal sensation intact. Pulse deficit: Distal pulses 2+ and bounding. Calf Tenderness:  No bilateral calf tenderness. Negative Era's sign bilaterally               Reflexes: Intact at knee and achilles bilaterally. Gait:  Patient in wheelchair. Skin:  Normal turgor. Warm, dry, without visible rash. Neurological:  Alert and oriented. Motor functions intact. Psychiatric: Mood and Affect: Mood normal. Behavior: Behavior normal.    Testing:   (All laboratory and radiology results have been personally reviewed by myself)  Labs:  No results found for this visit on 06/03/22. Imaging: All Radiology results interpreted by Radiologist unless otherwise noted. No orders to display       Assessment / Plan:   The patient's vitals, allergies, medications, and past medical history have been reviewed. Elfego was seen today for lower back pain. Diagnoses and all orders for this visit:    Acute left-sided low back pain with left-sided sciatica  -     triamcinolone acetonide (KENALOG-40) injection 40 mg  -     methylPREDNISolone (MEDROL DOSEPACK) 4 MG tablet; Take by mouth. -     XR LUMBAR SPINE (2-3 VIEWS); Future        - Disposition: Home    - Educational material printed for patient's review and were included in patient instructions. After Visit Summary was given to patient at the end of visit. - Discussed symptomatic treatments including Zanaflex as prescribed by PCP and Tylenol prn pain, rest, ice, and/or moist heat for additional relief. Patient is directed to follow-up with PCP in 1 week if symptoms persist. Discussed red flag symptoms with the patient today. Patient is directed to go to the ED immediately with any worsening symptoms or red flag symptoms including: fever, severe or worsening back pain, paresthesias, weakness, or GI/ incontinence. SIGNATURE: POLLO Castañeda    *NOTE: This report was transcribed using voice recognition software.  Every effort was made to ensure accuracy; however, inadvertent computerized transcription errors may be present.

## 2022-06-03 NOTE — PATIENT INSTRUCTIONS
Patient Education        Learning About Low Back Pain  What is low back pain? Low back pain is pain that can occur anywhere below the ribs and above thelegs. It is very common. Almost everyone has it at one time or another. Low back pain can be:   Acute. This is new pain that can last a few days to a few weeks--at the most a few months.  Chronic. This pain can last for more than a few months. Sometimes it can last for years. What are some myths about low back pain? Here are some common myths about low back pain--and the facts:  Myth: \"I need to rest my back when I have back pain. \"   Fact: Staying active won't hurt you. It may help you get better faster. Myth: \"I need prescription pain medicine. \"   Fact: It's best to try to let time and being active heal your back. Opioid pain medicines--such as hydrocodone or oxycodone--usually don't work any better than over-the-counter medicines like ibuprofen or naproxen. And opioids can cause serious problems like opioid use disorder or overdose. Moderate to severeopioid use disorder is sometimes called addiction. Myth: \"I need a test like an X-ray or an MRI to diagnose my low back pain. \"   Fact: Getting a test right away won't help you get better faster. And it could lead you down a treatment path you may not need, since most people get better ontheir own. What causes low back pain? In most cases, there isn't a clear cause. This can be frustrating, because yourback hurts and there's no obvious reason. Your back pain can be caused by:  Overuse or muscle strain. This can happen from playing sports, lifting heavy things, or not beingphysically fit. A herniated disc. This is a problem with the cushion between the bones in your back. Arthritis. With age, you may have changes in your bones that can narrow the space aroundyour nerves. Other causes. In rare cases, the cause is a serious illness like an infection or cancer.  Butthere are usually other symptoms too.  What are the symptoms? Back pain can come on quickly or over time. You may feel:   Pain in your hips or buttock.  Leg pain, numbness, tingling, or weakness. When a nerve gets squeezed--such as from a disc problem or arthritis--you may have symptoms in your leg or foot. You can even have leg symptoms from a back problem without having any pain in your back.  Pain that's sharp or dull, sometimes with stiffness or muscle spasms. It may be in one small area or over a broad area. But even bad pain doesn't mean that it's caused by something serious. How is low back pain diagnosed? A physical exam is the main way to diagnose low back pain. Your doctor may examine your back, check your nerves by testing your reflexes, and make sure that your muscles are strong. Your doctor also will ask questions about yourback and overall health. Most people don't need any tests right away. Tests often don't show the reason for your pain. If your pain lasts more than 6 weeks or you have symptoms that your doctor is more concerned about, then your doctor may order tests. These may include an X-ray, a CT scan, or an MRI. Sometimes other tests such as a bone scan or nerveconduction test may be done. How is low back pain treated? Most acute low back pain gets better on its own within a few weeks, no matter what the cause. Time and doing usual activities are all that most people needto feel better. Using heat or ice and taking over-the-counter pain medicine also can help whileyour body heals. If you aren't getting better on your own or your pain is very bad, your doctormay recommend:   Physical therapy.  Spinal manipulation, such as by a chiropractor.  Acupuncture.  Massage.  Injections of steroid medicine in your back (especially for pain that involves your legs). If you have chronic low back pain, treatment will help you understand andmanage your pain. Treatment may include:   Staying active.  This may include walking or doing back exercises.  Physical therapy.  Medicines. Some of these medicines are also used for other problems, like depression.  Pain management. Your doctor may have you see a pain specialist.   Counseling. Having chronic pain can be hard. It may help to talk to someone who can help you cope with your pain. Surgery isn't needed for most people. But it may help some types of low backpain. Follow-up care is a key part of your treatment and safety. Be sure to make and go to all appointments, and call your doctor if you are having problems. It's also a good idea to know your test results and keep alist of the medicines you take. When should you call for help? Call 911 anytime you think you may need emergency care. For example, call if:   You can't move a leg at all. Call your doctor now or seek immediate medical care if:   You have new or worse symptoms in your legs, belly, or buttocks. Symptoms may include:  ? Numbness or tingling. ? Weakness. ? Pain.  You lose bladder or bowel control. Watch closely for changes in your health, and be sure to contact your doctor if:   Along with the back pain, you have a fever, lose weight, or don't feel well.  You do not get better as expected. Where can you learn more? Go to https://[employer].travayl. org and sign in to your [employer] account. Enter A007 in the [facility] box to learn more about \"Learning About Low Back Pain. \"     If you do not have an account, please click on the \"Sign Up Now\" link. Current as of: July 1, 2021               Content Version: 13.2  © 8296-4322 [employer]wise, Incorporated. Care instructions adapted under license by [facility] ([facility]). If you have questions about a medical condition or this instruction, always ask your healthcare professional. [place] [address] any warranty or liability for your use of this information.

## 2022-06-10 ENCOUNTER — APPOINTMENT (OUTPATIENT)
Dept: GENERAL RADIOLOGY | Age: 82
DRG: 871 | End: 2022-06-10
Attending: INTERNAL MEDICINE
Payer: MEDICARE

## 2022-06-10 ENCOUNTER — HOSPITAL ENCOUNTER (INPATIENT)
Age: 82
LOS: 3 days | Discharge: HOME | DRG: 871 | End: 2022-06-13
Attending: INTERNAL MEDICINE | Admitting: INTERNAL MEDICINE
Payer: MEDICARE

## 2022-06-10 DIAGNOSIS — K68.12 PSOAS MUSCLE ABSCESS (HCC): Primary | ICD-10-CM

## 2022-06-10 PROBLEM — L02.91 ABSCESS: Status: ACTIVE | Noted: 2022-06-10

## 2022-06-10 LAB
C-REACTIVE PROTEIN: 28.3 MG/DL (ref 0–0.4)
HBA1C MFR BLD: 7.5 % (ref 4–5.6)
INR BLD: 1.4
METER GLUCOSE: 202 MG/DL (ref 74–99)
METER GLUCOSE: 239 MG/DL (ref 74–99)
PROTHROMBIN TIME: 15.1 SEC (ref 9.3–12.4)
SEDIMENTATION RATE, ERYTHROCYTE: 120 MM/HR (ref 0–20)

## 2022-06-10 PROCEDURE — 87040 BLOOD CULTURE FOR BACTERIA: CPT

## 2022-06-10 PROCEDURE — 6370000000 HC RX 637 (ALT 250 FOR IP): Performed by: NURSE PRACTITIONER

## 2022-06-10 PROCEDURE — 2580000003 HC RX 258: Performed by: NURSE PRACTITIONER

## 2022-06-10 PROCEDURE — 85610 PROTHROMBIN TIME: CPT

## 2022-06-10 PROCEDURE — 82962 GLUCOSE BLOOD TEST: CPT

## 2022-06-10 PROCEDURE — 2580000003 HC RX 258: Performed by: STUDENT IN AN ORGANIZED HEALTH CARE EDUCATION/TRAINING PROGRAM

## 2022-06-10 PROCEDURE — 6360000002 HC RX W HCPCS: Performed by: STUDENT IN AN ORGANIZED HEALTH CARE EDUCATION/TRAINING PROGRAM

## 2022-06-10 PROCEDURE — 86140 C-REACTIVE PROTEIN: CPT

## 2022-06-10 PROCEDURE — 2140000000 HC CCU INTERMEDIATE R&B

## 2022-06-10 PROCEDURE — 83036 HEMOGLOBIN GLYCOSYLATED A1C: CPT

## 2022-06-10 PROCEDURE — 85651 RBC SED RATE NONAUTOMATED: CPT

## 2022-06-10 PROCEDURE — 36415 COLL VENOUS BLD VENIPUNCTURE: CPT

## 2022-06-10 PROCEDURE — 71045 X-RAY EXAM CHEST 1 VIEW: CPT

## 2022-06-10 RX ORDER — ACETAMINOPHEN 325 MG/1
650 TABLET ORAL EVERY 6 HOURS PRN
Status: DISCONTINUED | OUTPATIENT
Start: 2022-06-10 | End: 2022-06-13 | Stop reason: HOSPADM

## 2022-06-10 RX ORDER — ONDANSETRON 4 MG/1
4 TABLET, ORALLY DISINTEGRATING ORAL EVERY 8 HOURS PRN
Status: DISCONTINUED | OUTPATIENT
Start: 2022-06-10 | End: 2022-06-13 | Stop reason: HOSPADM

## 2022-06-10 RX ORDER — ACETAMINOPHEN 650 MG/1
650 SUPPOSITORY RECTAL EVERY 6 HOURS PRN
Status: DISCONTINUED | OUTPATIENT
Start: 2022-06-10 | End: 2022-06-13 | Stop reason: HOSPADM

## 2022-06-10 RX ORDER — SODIUM CHLORIDE 9 MG/ML
INJECTION, SOLUTION INTRAVENOUS CONTINUOUS
Status: DISCONTINUED | OUTPATIENT
Start: 2022-06-10 | End: 2022-06-13 | Stop reason: HOSPADM

## 2022-06-10 RX ORDER — DEXTROSE MONOHYDRATE 50 MG/ML
100 INJECTION, SOLUTION INTRAVENOUS PRN
Status: DISCONTINUED | OUTPATIENT
Start: 2022-06-10 | End: 2022-06-13 | Stop reason: HOSPADM

## 2022-06-10 RX ORDER — SODIUM CHLORIDE 0.9 % (FLUSH) 0.9 %
10 SYRINGE (ML) INJECTION PRN
Status: DISCONTINUED | OUTPATIENT
Start: 2022-06-10 | End: 2022-06-13 | Stop reason: HOSPADM

## 2022-06-10 RX ORDER — SODIUM CHLORIDE 0.9 % (FLUSH) 0.9 %
5-40 SYRINGE (ML) INJECTION EVERY 12 HOURS SCHEDULED
Status: DISCONTINUED | OUTPATIENT
Start: 2022-06-10 | End: 2022-06-13 | Stop reason: HOSPADM

## 2022-06-10 RX ORDER — POLYETHYLENE GLYCOL 3350 17 G/17G
17 POWDER, FOR SOLUTION ORAL DAILY PRN
Status: DISCONTINUED | OUTPATIENT
Start: 2022-06-10 | End: 2022-06-13 | Stop reason: HOSPADM

## 2022-06-10 RX ORDER — ENOXAPARIN SODIUM 100 MG/ML
40 INJECTION SUBCUTANEOUS DAILY
Status: DISCONTINUED | OUTPATIENT
Start: 2022-06-10 | End: 2022-06-13 | Stop reason: HOSPADM

## 2022-06-10 RX ORDER — SODIUM CHLORIDE 9 MG/ML
INJECTION, SOLUTION INTRAVENOUS PRN
Status: DISCONTINUED | OUTPATIENT
Start: 2022-06-10 | End: 2022-06-13 | Stop reason: HOSPADM

## 2022-06-10 RX ORDER — ASPIRIN 81 MG/1
81 TABLET, CHEWABLE ORAL DAILY
Status: DISCONTINUED | OUTPATIENT
Start: 2022-06-10 | End: 2022-06-13 | Stop reason: HOSPADM

## 2022-06-10 RX ORDER — ONDANSETRON 2 MG/ML
4 INJECTION INTRAMUSCULAR; INTRAVENOUS EVERY 6 HOURS PRN
Status: DISCONTINUED | OUTPATIENT
Start: 2022-06-10 | End: 2022-06-13 | Stop reason: HOSPADM

## 2022-06-10 RX ORDER — INSULIN LISPRO 100 [IU]/ML
0-6 INJECTION, SOLUTION INTRAVENOUS; SUBCUTANEOUS
Status: DISCONTINUED | OUTPATIENT
Start: 2022-06-10 | End: 2022-06-13 | Stop reason: HOSPADM

## 2022-06-10 RX ORDER — INSULIN LISPRO 100 [IU]/ML
0-3 INJECTION, SOLUTION INTRAVENOUS; SUBCUTANEOUS NIGHTLY
Status: DISCONTINUED | OUTPATIENT
Start: 2022-06-10 | End: 2022-06-13 | Stop reason: HOSPADM

## 2022-06-10 RX ADMIN — INSULIN LISPRO 1 UNITS: 100 INJECTION, SOLUTION INTRAVENOUS; SUBCUTANEOUS at 20:45

## 2022-06-10 RX ADMIN — Medication 10 ML: at 22:46

## 2022-06-10 RX ADMIN — PIPERACILLIN AND TAZOBACTAM 3375 MG: 3; .375 INJECTION, POWDER, LYOPHILIZED, FOR SOLUTION INTRAVENOUS at 17:40

## 2022-06-10 RX ADMIN — INSULIN LISPRO 2 UNITS: 100 INJECTION, SOLUTION INTRAVENOUS; SUBCUTANEOUS at 17:11

## 2022-06-10 RX ADMIN — SODIUM CHLORIDE: 9 INJECTION, SOLUTION INTRAVENOUS at 10:26

## 2022-06-10 RX ADMIN — PIPERACILLIN AND TAZOBACTAM 3375 MG: 3; .375 INJECTION, POWDER, LYOPHILIZED, FOR SOLUTION INTRAVENOUS at 22:38

## 2022-06-10 NOTE — CONSULTS
GENERAL SURGERY  CONSULT NOTE  6/10/2022    Physician Consulted: Dr. Viki Troy  Reason for Consult: psoas abscess  Referring Physician: Dr. Keily Robins is a 81 y.o. female who presents for evaluation of back pain. General surgery was consulted for psoas abscess. Patient initially presented to outside hospital he was transferred to [facility] for further care. She denies any abdominal pain, nausea, vomiting, and changes in her bowel habits. She is reporting right flank/back pain. She is not on any anticoagulation. CT AP demonstrates left psoas abscess. Patient was found to have Proteus mirabilis blood cultures. She has been afebrile, hemodynamically stable, and with leukocytosis of 11.6. No past medical history on file. No past surgical history on file. Medications Prior to Admission:    Prior to Admission medications    Medication Sig Start Date End Date Taking? Authorizing Provider   methylPREDNISolone (MEDROL DOSEPACK) 4 MG tablet Take by mouth. 6/3/22   Delfina Hummilady., APRN - CNP   tiZANidine (ZANAFLEX) 2 MG tablet Take 1 tablet by mouth nightly as needed (back pain)  Patient not taking: Reported on 6/10/2022 5/31/22   Diana Montanez MD   pioglitazone (ACTOS) 30 MG tablet TAKE 1 TABLET DAILY 4/18/22   Diana Montanez MD   Calcium Carb-Cholecalciferol (CALCIUM + D3) 600-200 MG-UNIT TABS tablet Take 1 tablet by mouth daily     Historical Provider, MD   Multiple Vitamin (MULTI-VITAMIN DAILY) TABS Take 1 tablet by mouth daily    Historical Provider, MD   aspirin 81 MG tablet Take 81 mg by mouth daily    Historical Provider, MD   Turmeric 500 MG TABS Take 1 tablet by mouth daily   Patient not taking: Reported on 6/10/2022    Historical Provider, MD       No Known Allergies    No family history on file.     Social History     Tobacco Use    Smoking status: Never Smoker    Smokeless tobacco: Never Used   Vaping Use    Vaping Use: Never used   Substance Use Topics    Alcohol use: Never    Drug use: Never         Review of Systems   General ROS: negative for - chills or fever  Hematological and Lymphatic ROS: negative for - bleeding problems or blood clots  Respiratory ROS: no cough, shortness of breath, or wheezing  Cardiovascular ROS: no chest pain or dyspnea on exertion  Gastrointestinal ROS: no abdominal pain, change in bowel habits, or black or bloody stools  Genito-Urinary ROS: no dysuria, trouble voiding, or hematuria  Musculoskeletal ROS: negative for - muscular weakness      PHYSICAL EXAM:    Vitals:    06/10/22 1013   BP: (!) 187/87   Pulse: 88   Resp: 18   Temp: 97.8 °F (36.6 °C)   SpO2: 98%       General Appearance:  awake, alert, oriented, in no acute distress  Skin:  Skin color, texture, turgor normal. No rashes or lesions. Head/face:  NCAT  Eyes:  No gross abnormalities. Lungs: Normal work of breathing on room air  Heart: Regular rate, hypertensive  Abdomen: Soft, nontender, nondistended    LABS:    CBC  Recent Labs     06/10/22  0535   WBC 11.6*   HGB 8.3*   HCT 25.2*        BMP  Recent Labs     06/10/22  0535   *   K 3.8   CL 93*   CO2 29   BUN 11   CREATININE 0.8   CALCIUM 7.4*     Liver Function  Recent Labs     06/08/22  0754   LIPASE 31   BILITOT 0.6   AST 20   ALT 27   ALKPHOS 190*   PROT 6.4   LABALBU 2.4*     Recent Labs     06/08/22  1057   LACTATE 1.9     Recent Labs     06/10/22  1158   INR 1.4       RADIOLOGY    XR CHEST PORTABLE    Result Date: 6/10/2022  EXAMINATION: ONE XRAY VIEW OF THE CHEST 6/10/2022 11:26 am COMPARISON: CT abdomen and pelvis without 09/08/2022 HISTORY: ORDERING SYSTEM PROVIDED HISTORY: hypoxia TECHNOLOGIST PROVIDED HISTORY: Reason for exam:->hypoxia What reading provider will be dictating this exam?->CRC FINDINGS: The lungs are without acute focal process. There is no effusion or pneumothorax. The cardiomediastinal silhouette is without acute process. The osseous structures are without acute process.      No acute process. ASSESSMENT:  81 y.o. female with left psoas hematoma. PLAN:  Antibiotics per ID  IR could not drain abscess  No plans for acute surgical intervention at this time. Patient findings and plan discussed with Dr. Pako Rojas.      Electronically signed by Heike Rm MD on 6/10/22 at 5:47 PM EDT

## 2022-06-10 NOTE — CONSULTS
[address] [place] [address] [facility]    Consultation Note     Admit Date: 6/10/2022  9:48 AM    Reason for Consult:   Groin abscess    Attending Physician:  Anabell Pryor MD     Chief Complaint: pressure at the lower abdomen    HISTORY OF PRESENT ILLNESS:   The patient is a 81 y.o.  female known to the Infectious Diseases service. The patient was apparently seen by ID at [facility]. She started having pain in her back and left side of the abdomen for almost 2 weeks. And went to primary care, was given muscle relaxer which did not help. She went to [facility] ER last Wednesday and was admitted. She was given IV antibiotics and found there was a deeper abscess in her groin and she was sent here for IR management of the abscess. She and her  and her daughter was in the room and they were all frustrated that her care is not progressing here. Since admission, she is afebrile, HS stable. Labs showed white count of 11.6, hgb of 8.3, platelet of 296. Renal and liver function tests are ok. ESR is 120, CRp is 28.3, blood cx 6/8 2out of 2 showed Proteus mirabilis. Urine cx also showed Proteus. Patient is not on any abx here. I got consulted for further management. Past Medical History:    No past medical history on file. Past Surgical History:    No past surgical history on file.   Current Medications:   Scheduled Meds:   [Held by provider] aspirin  81 mg Oral Daily    oyster shell calcium w/D  1 tablet Oral Daily    sodium chloride flush  5-40 mL IntraVENous 2 times per day    enoxaparin  40 mg SubCUTAneous Daily    insulin lispro  0-6 Units SubCUTAneous TID     insulin lispro  0-3 Units SubCUTAneous Nightly     Continuous Infusions:   sodium chloride 75 mL/hr at 06/10/22 1026    sodium chloride      dextrose       PRN Meds:sodium chloride flush, sodium chloride, ondansetron **OR** ondansetron, polyethylene glycol, acetaminophen **OR** acetaminophen, glucose, dextrose bolus **OR** dextrose bolus, glucagon (rDNA), dextrose    Allergies:  Patient has no known allergies. Social History:   Social History     Socioeconomic History    Marital status:      Spouse name: Not on file    Number of children: Not on file    Years of education: Not on file    Highest education level: Not on file   Occupational History    Not on file   Tobacco Use    Smoking status: Never Smoker    Smokeless tobacco: Never Used   Vaping Use    Vaping Use: Never used   Substance and Sexual Activity    Alcohol use: Never    Drug use: Never    Sexual activity: Not Currently     Partners: Male     Birth control/protection: Post-menopausal   Other Topics Concern    Not on file   Social History Narrative    Not on file     Social Determinants of Health     Financial Resource Strain:     Difficulty of Paying Living Expenses: Not on file   Food Insecurity:     Worried About [address] in the Last Year: Not on file    [address] [employer] in the Last Year: Not on file   Transportation Needs:     Lack of Transportation (Medical): Not on file    Lack of Transportation (Non-Medical):  Not on file   Physical Activity:     Days of Exercise per Week: Not on file    Minutes of Exercise per Session: Not on file   Stress:     Feeling of Stress : Not on file   Social Connections:     Frequency of Communication with Friends and Family: Not on file    Frequency of Social Gatherings with Friends and Family: Not on file    Attends Orthodoxy Services: Not on file    Active Member of Clubs or Organizations: Not on file    Attends Club or Organization Meetings: Not on file    Marital Status: Not on file   Intimate Partner Violence:     Fear of Current or Ex-Partner: Not on file    Emotionally Abused: Not on file    Physically Abused: Not on file    Sexually Abused: Not on file   Housing Stability:     Unable to Pay for Housing in the Last Year: Not on file    Number of [place] in the Last Year: Not on file  Unstable Housing in the Last Year: Not on file       Family History:   No family history on file. . Otherwise non-pertinent to the chief complaint. REVIEW OF SYSTEMS:    As mentioned in HPI, all other systems negative. PHYSICAL EXAM:    Vitals:    BP (!) 187/87   Pulse 88   Temp 97.8 °F (36.6 °C) (Oral)   Resp 18   Ht 5' 3\" (1.6 m)   Wt 220 lb (99.8 kg)   SpO2 98%   BMI 38.97 kg/m²   Constitutional: The patient is awake, alert, and oriented. Skin: Warm and dry. No rashes were noted. No jaundice. HEENT: Eyes show round, and reactive pupils. Moist mucous membranes, no ulcerations, no thrush. Neck: Supple to movements. No lymphadenopathy. Chest: No use of accessory muscles to breathe. Symmetrical expansion. Auscultation reveals no wheezing, crackles, or rhonchi. Cardiovascular: S1 and S2 are rhythmic and regular. No murmurs appreciated. Abdomen: soft, induration at the pelvic area: no redness or pain on palpation. Extremities: No clubbing, no cyanosis, bilateral LE edema 2+  Musculoskeletal: no gross focal abnormalities  Neurological: alert, oriented x 3  Lines: peripheral      CBC+dif:  Recent Labs     06/08/22  0754 06/08/22  0754 06/09/22  0543 06/09/22  0543 06/10/22  0535   WBC 12.7*  --  13.3*  --  11.6*   HGB 9.8*   < > 8.8*   < > 8.3*   HCT 29.5*   < > 26.3*   < > 25.2*   MCV 88.9   < > 88.5   < > 88.0      < > 290   < > 265   NEUTROABS 10.7*   < > 11.1*   < > 9.6*    < > = values in this interval not displayed.      No results found for: CRP  No results found for: CRPHS  No results found for: SEDRATE  Lab Results   Component Value Date    ALT 27 06/08/2022    AST 20 06/08/2022    ALKPHOS 190 (H) 06/08/2022    BILITOT 0.6 06/08/2022     Lab Results   Component Value Date     06/10/2022    K 3.8 06/10/2022    CL 93 06/10/2022    CO2 29 06/10/2022    BUN 11 06/10/2022    CREATININE 0.8 06/10/2022    GFRAA >60 10/04/2021    AGRATIO 0.6 06/08/2022    LABGLOM 69 06/10/2022 GLUCOSE 157 06/10/2022    PROT 6.4 06/08/2022    LABALBU 2.4 06/08/2022    CALCIUM 7.4 06/10/2022    BILITOT 0.6 06/08/2022    ALKPHOS 190 06/08/2022    AST 20 06/08/2022    ALT 27 06/08/2022       Lab Results   Component Value Date    PROTIME 15.1 06/10/2022    INR 1.4 06/10/2022       Lab Results   Component Value Date    TSH 1.210 10/04/2021       Lab Results   Component Value Date    NITRITE positive 09/16/2019    COLORU YELLOW 06/08/2022    COLORU Yellow 10/04/2021    PHUR 6.0 06/08/2022    WBCUA LARGE 06/08/2022    RBCUA MODERATE 06/08/2022    MUCUS SMALL 06/08/2022    BACTERIA Trace 06/08/2022    BACTERIA MANY 10/04/2021    CLARITYU Clear 10/04/2021    SPECGRAV 1.025 10/04/2021    LEUKOCYTESUR SMALL 10/04/2021    UROBILINOGEN NEGATIVE 06/08/2022    BILIRUBINUR NEGATIVE 06/08/2022    BILIRUBINUR neg 09/16/2019    BLOODU Negative 10/04/2021    GLUCOSEU 50 06/08/2022    AMORPHOUS FEW 06/08/2022       No results found for: PHB7IEK, BEART, P3SRNPPS, PHART, THGBART, COY8EQN, PO2ART, OBX5AGS  Radiology:  XR CHEST PORTABLE    (Results Pending)   IR INTERVENTIONAL RADIOLOGY PROCEDURE REQUEST    (Results Pending)      CT A/P 6/8:  1.  Abnormal gas is identified within pelvic soft tissues bilaterally in the groin as well as      caudal to the symphysis pubis.  Foci of gas are identified amidst soft tissue edema medial to the      left iliacus muscle (close to left external iliac vasculature).  Enlarged 2.5 cm left iliac chain      lymph nodes is suspected (with smaller lymph nodes not excluded).                Microbiology:  Blood cx 6/8: Proteus mirabilis (pansusceptible)  Urine cx 6/8: proteus mirabilis  Blood cx 6/10: negative so far    Assessment:  · Proteus mirabilis bacteremia:   · Soft tissue infection in pelvic area:   · Leucocytosis:    Plan:    · Started him on IV zosyn   · Consulted gen surgery. · Will follow with you    Thank you for having us see this patient in consultation.  I will be discussing this case with the treating physicians.       Electronically signed by Tay Stone MD on 6/10/2022 at 1:07 PM

## 2022-06-11 ENCOUNTER — APPOINTMENT (OUTPATIENT)
Dept: CT IMAGING | Age: 82
DRG: 871 | End: 2022-06-11
Attending: INTERNAL MEDICINE
Payer: MEDICARE

## 2022-06-11 PROBLEM — K68.12 PSOAS MUSCLE ABSCESS (HCC): Status: ACTIVE | Noted: 2022-06-11

## 2022-06-11 LAB
ANION GAP SERPL CALCULATED.3IONS-SCNC: 11 MMOL/L (ref 7–16)
BASOPHILS ABSOLUTE: 0.03 E9/L (ref 0–0.2)
BASOPHILS RELATIVE PERCENT: 0.3 % (ref 0–2)
BUN BLDV-MCNC: 10 MG/DL (ref 6–23)
CALCIUM SERPL-MCNC: 7.5 MG/DL (ref 8.6–10.2)
CHLORIDE BLD-SCNC: 95 MMOL/L (ref 98–107)
CO2: 27 MMOL/L (ref 22–29)
CREAT SERPL-MCNC: 0.7 MG/DL (ref 0.5–1)
EOSINOPHILS ABSOLUTE: 0.06 E9/L (ref 0.05–0.5)
EOSINOPHILS RELATIVE PERCENT: 0.5 % (ref 0–6)
GFR AFRICAN AMERICAN: >60
GFR NON-AFRICAN AMERICAN: >60 ML/MIN/1.73
GLUCOSE BLD-MCNC: 176 MG/DL (ref 74–99)
HCT VFR BLD CALC: 25.9 % (ref 34–48)
HEMOGLOBIN: 8.4 G/DL (ref 11.5–15.5)
IMMATURE GRANULOCYTES #: 0.2 E9/L
IMMATURE GRANULOCYTES %: 1.8 % (ref 0–5)
LYMPHOCYTES ABSOLUTE: 1.33 E9/L (ref 1.5–4)
LYMPHOCYTES RELATIVE PERCENT: 11.7 % (ref 20–42)
MCH RBC QN AUTO: 29.4 PG (ref 26–35)
MCHC RBC AUTO-ENTMCNC: 32.4 % (ref 32–34.5)
MCV RBC AUTO: 90.6 FL (ref 80–99.9)
METER GLUCOSE: 179 MG/DL (ref 74–99)
METER GLUCOSE: 190 MG/DL (ref 74–99)
METER GLUCOSE: 232 MG/DL (ref 74–99)
METER GLUCOSE: 267 MG/DL (ref 74–99)
MONOCYTES ABSOLUTE: 0.49 E9/L (ref 0.1–0.95)
MONOCYTES RELATIVE PERCENT: 4.3 % (ref 2–12)
NEUTROPHILS ABSOLUTE: 9.21 E9/L (ref 1.8–7.3)
NEUTROPHILS RELATIVE PERCENT: 81.4 % (ref 43–80)
PDW BLD-RTO: 14.1 FL (ref 11.5–15)
PLATELET # BLD: 284 E9/L (ref 130–450)
PMV BLD AUTO: 9.4 FL (ref 7–12)
POTASSIUM REFLEX MAGNESIUM: 4.3 MMOL/L (ref 3.5–5)
RBC # BLD: 2.86 E12/L (ref 3.5–5.5)
SODIUM BLD-SCNC: 133 MMOL/L (ref 132–146)
WBC # BLD: 11.3 E9/L (ref 4.5–11.5)

## 2022-06-11 PROCEDURE — 6360000002 HC RX W HCPCS: Performed by: STUDENT IN AN ORGANIZED HEALTH CARE EDUCATION/TRAINING PROGRAM

## 2022-06-11 PROCEDURE — 6360000004 HC RX CONTRAST MEDICATION: Performed by: STUDENT IN AN ORGANIZED HEALTH CARE EDUCATION/TRAINING PROGRAM

## 2022-06-11 PROCEDURE — 2580000003 HC RX 258: Performed by: STUDENT IN AN ORGANIZED HEALTH CARE EDUCATION/TRAINING PROGRAM

## 2022-06-11 PROCEDURE — 2140000000 HC CCU INTERMEDIATE R&B

## 2022-06-11 PROCEDURE — 80048 BASIC METABOLIC PNL TOTAL CA: CPT

## 2022-06-11 PROCEDURE — 85025 COMPLETE CBC W/AUTO DIFF WBC: CPT

## 2022-06-11 PROCEDURE — 74177 CT ABD & PELVIS W/CONTRAST: CPT

## 2022-06-11 PROCEDURE — 36415 COLL VENOUS BLD VENIPUNCTURE: CPT

## 2022-06-11 PROCEDURE — 6370000000 HC RX 637 (ALT 250 FOR IP): Performed by: NURSE PRACTITIONER

## 2022-06-11 PROCEDURE — 99223 1ST HOSP IP/OBS HIGH 75: CPT | Performed by: SURGERY

## 2022-06-11 PROCEDURE — 6360000002 HC RX W HCPCS: Performed by: NURSE PRACTITIONER

## 2022-06-11 PROCEDURE — 82962 GLUCOSE BLOOD TEST: CPT

## 2022-06-11 PROCEDURE — 2580000003 HC RX 258: Performed by: NURSE PRACTITIONER

## 2022-06-11 RX ORDER — SODIUM CHLORIDE 0.9 % (FLUSH) 0.9 %
10 SYRINGE (ML) INJECTION ONCE
Status: COMPLETED | OUTPATIENT
Start: 2022-06-11 | End: 2022-06-11

## 2022-06-11 RX ADMIN — ENOXAPARIN SODIUM 40 MG: 100 INJECTION SUBCUTANEOUS at 09:08

## 2022-06-11 RX ADMIN — PIPERACILLIN AND TAZOBACTAM 3375 MG: 3; .375 INJECTION, POWDER, LYOPHILIZED, FOR SOLUTION INTRAVENOUS at 15:12

## 2022-06-11 RX ADMIN — INSULIN LISPRO 1 UNITS: 100 INJECTION, SOLUTION INTRAVENOUS; SUBCUTANEOUS at 18:04

## 2022-06-11 RX ADMIN — Medication 10 ML: at 20:53

## 2022-06-11 RX ADMIN — IOPAMIDOL 90 ML: 755 INJECTION, SOLUTION INTRAVENOUS at 13:48

## 2022-06-11 RX ADMIN — PIPERACILLIN AND TAZOBACTAM 3375 MG: 3; .375 INJECTION, POWDER, LYOPHILIZED, FOR SOLUTION INTRAVENOUS at 06:02

## 2022-06-11 RX ADMIN — INSULIN LISPRO 1 UNITS: 100 INJECTION, SOLUTION INTRAVENOUS; SUBCUTANEOUS at 09:08

## 2022-06-11 RX ADMIN — Medication 10 ML: at 13:48

## 2022-06-11 RX ADMIN — INSULIN LISPRO 3 UNITS: 100 INJECTION, SOLUTION INTRAVENOUS; SUBCUTANEOUS at 12:47

## 2022-06-11 RX ADMIN — CALCIUM CARBONATE-VITAMIN D TAB 500 MG-200 UNIT 1 TABLET: 500-200 TAB at 09:08

## 2022-06-11 RX ADMIN — PIPERACILLIN AND TAZOBACTAM 3375 MG: 3; .375 INJECTION, POWDER, LYOPHILIZED, FOR SOLUTION INTRAVENOUS at 22:17

## 2022-06-11 RX ADMIN — INSULIN LISPRO 1 UNITS: 100 INJECTION, SOLUTION INTRAVENOUS; SUBCUTANEOUS at 20:52

## 2022-06-11 ASSESSMENT — PAIN SCALES - GENERAL
PAINLEVEL_OUTOF10: 0

## 2022-06-11 NOTE — PLAN OF CARE
Problem: Chronic Conditions and Co-morbidities  Goal: Patient's chronic conditions and co-morbidity symptoms are monitored and maintained or improved  Outcome: Progressing     Problem: Discharge Planning  Goal: Discharge to home or other facility with appropriate resources  Outcome: Progressing     Problem: Pain  Goal: Verbalizes/displays adequate comfort level or baseline comfort level  Outcome: Progressing     Problem: Skin/Tissue Integrity  Goal: Absence of new skin breakdown  Description: 1. Monitor for areas of redness and/or skin breakdown  2. Assess vascular access sites hourly  3. Every 4-6 hours minimum:  Change oxygen saturation probe site  4. Every 4-6 hours:  If on nasal continuous positive airway pressure, respiratory therapy assess nares and determine need for appliance change or resting period.   Outcome: Progressing     Problem: Safety - Adult  Goal: Free from fall injury  Outcome: Progressing  Flowsheets (Taken 6/11/2022 1003)  Free From Fall Injury: Instruct family/caregiver on patient safety     Problem: ABCDS Injury Assessment  Goal: Absence of physical injury  Outcome: Progressing  Flowsheets (Taken 6/11/2022 1003)  Absence of Physical Injury: Implement safety measures based on patient assessment

## 2022-06-11 NOTE — CONSULTS
[facility]  GENERAL SURGERY     ATTENDING PHYSICIAN PROGRESS NOTE   I have examined the patient, reviewed the record, and discussed the case with the APN/  Resident. I have reviewed all relevant labs and imaging data. Please refer to the  APN/ resident's note. I agree with the  assessment and plan with the following corrections/ additions. The following summarizes my clinical findings and independent assessment. CC: Psoas abscess    HPI:  81 y.o. female who initially presented with lower back pain and groin pain. The patient reported  acute, constant   pain localized to the low back and groin that started 2 weeks ago. The intensity of the pain is moderate. Initially she was treated with a muscle relaxer which has not improved. Last week she went to [facility] ED and was admitted. She was given IV antibiotics and found to have a deeper abscess. She was transferred from [facility] to [facility] for management of these abscesses. . There are no alleviating or worsening factors regarding the pain. Patient was found to have a Proteus bloodstream infection on June 8 that grew 2 out of 2 bottles currently patient denies any fevers. She feels better    Past medical/surgical/family history: as noted above.   Medications/allergies: as noted above  Social History: as noted above    Review of Systems:  Review of Systems - History obtained from the patient  General ROS: negative  Psychological ROS: negative  Ophthalmic ROS: negative  Allergy and Immunology ROS: negative  Hematological and Lymphatic ROS: negative  Endocrine ROS: negative  Breast ROS: negative  Respiratory ROS: negative  Cardiovascular ROS: negative  Gastrointestinal ROS: positive for -groin pain  Genito-Urinary ROS: negative  Musculoskeletal ROS: Positive for back pain        Physical Exam:  BP (!) 171/91   Pulse 92   Temp 97.5 °F (36.4 °C) (Oral)   Resp 18   Ht 5' 3\" (1.6 m)   Wt 220 lb (99.8 kg)   SpO2 94%   BMI 38.97 kg/m²     Vitals:    06/11/22 0800   BP: (!) 171/91   Pulse: 92   Resp: 18   Temp: 97.5 °F (36.4 °C)   SpO2: 94%       PSYCH: mood and affect normal, alert and oriented x 3  CONSTITUTIONAL: No apparent distress, comfortable  EYES: Sclera white, pupils equal round and reactive to light  ENMT:  Hearing normal, trachea midline, ears externally intact  LYMPH: no lympadenopathy in neck. No lympadenopathy in groins  RESP: Breath sounds were clear and equal with no rales, wheezes, or rhonchi. Respiratory effort was normal with no retractions or use of accessory muscles. CV: Heart sounds were normal with a regular rate and rhythm. No pedal edema  GI/ Abdomen: The abdomen was soft and non distended. There was no tenderness, guarding, rebound, or rigidity. There was no                     masses, hepatosplenomegaly, or hernias. Genitourinary: No inguinal hernias were noted on coughing and straining. Rectal -deferred  MSK: no clubbing/ no cyanosis/1 patient moves her legs, she feels worsening back pain           ASSESSMENT:  Principal Problem:    Abscess  Active Problems:    Psoas muscle abscess (HCC)  Resolved Problems:    * No resolved hospital problems. *       PLAN:  · I have reviewed the prior progress note from the patient's primary care provider visit on [address]ag[address] 10  · . · I have reviewed the progress note from the infectious disease doctors visit on Milag[address] 10  ·   · I have reviewed the following test results: CBC-white blood cell count 11.3 platelets 532, hemoglobin 8.4, BMP with a sodium 133 creatinine 0.7. ·   · I have personally reviewed the CAT Scan imaging and my interpretation is bilateral psoas infections no discrete abscess able to be drained  ·   · Patient's lower back and groin pain is secondary to her psoas muscle abscess.   · I explained to the patient that the cause of this is most likely hematogenous  spread from her bloodstream infection from the Proteus bacteremia  · Since IR was unable to drain anything, my recommendation is to continue the IV Zosyn antibiotics per ID recommendations  · Clinically patient is improving with antibiotics  · No plans for surgical intervention  · We will follow along closely      Zacarias Sigala MD, FACS  6/11/2022  8:40 AM    NOTE: This report was transcribed using voice recognition software. Every effort was made to ensure accuracy; however, inadvertent computerized transcription errors may be present.

## 2022-06-11 NOTE — PROGRESS NOTES
[address] [place] [address] [facility]    Progress Note     C/C : Proteus bacteremia, UTI     Pt denies fever and chills  Denies dysuria  Denies abdomen pain   Afebrile       Current Facility-Administered Medications   Medication Dose Route Frequency Provider Last Rate Last Admin    [Held by provider] aspirin chewable tablet 81 mg  81 mg Oral Daily Froy Pretty, APRN - CNP        oyster shell calcium w/D 500-200 MG-UNIT tablet 1 tablet  1 tablet Oral Daily Froy Pretty, APRN - CNP   1 tablet at 06/11/22 0908    0.9 % sodium chloride infusion   IntraVENous Continuous Froy Pretty APRN - CNP 75 mL/hr at 06/10/22 1026 New Bag at 06/10/22 1026    sodium chloride flush 0.9 % injection 5-40 mL  5-40 mL IntraVENous 2 times per day Froy Pretty, APRN - CNP   10 mL at 06/10/22 2246    sodium chloride flush 0.9 % injection 10 mL  10 mL IntraVENous PRN Froy Pretty, APRN - CNP        0.9 % sodium chloride infusion   IntraVENous PRN Froy Pretty, APRN - CNP        ondansetron (ZOFRAN-ODT) disintegrating tablet 4 mg  4 mg Oral Q8H PRN Froy Pretty, APRN - CNP        Or    ondansetron (ZOFRAN) injection 4 mg  4 mg IntraVENous Q6H PRN Froy Pretty, APRN - CNP        polyethylene glycol (GLYCOLAX) packet 17 g  17 g Oral Daily PRN Froy Pretty, APRN - CNP        acetaminophen (TYLENOL) tablet 650 mg  650 mg Oral Q6H PRN Froy Pretty, APRN - CNP        Or    acetaminophen (TYLENOL) suppository 650 mg  650 mg Rectal Q6H PRN [place] Maria De Jesus, APRN - CNP        enoxaparin (LOVENOX) injection 40 mg  40 mg SubCUTAneous Daily Froy Pretty, APRN - CNP   40 mg at 06/11/22 0908    insulin lispro (HUMALOG) injection vial 0-6 Units  0-6 Units SubCUTAneous TID WC Froy Pretty, APRN - CNP   1 Units at 06/11/22 0908    insulin lispro (HUMALOG) injection vial 0-3 Units  0-3 Units SubCUTAneous Nightly Froy Pretty, APRN - CNP   1 Units at 06/10/22 2045    glucose chewable tablet 16 g  4 tablet Oral PRN Emma Main, APRN - CNP        dextrose bolus 10% 125 mL  125 mL IntraVENous PRN Emma Main, APRN - CNP        Or    dextrose bolus 10% 250 mL  250 mL IntraVENous PRN Emma Main, APRN - CNP        glucagon (rDNA) injection 1 mg  1 mg IntraMUSCular PRN Mema Main, APRN - CNP        dextrose 5 % solution  100 mL/hr IntraVENous PRN Emma Main, APRN - CNP        piperacillin-tazobactam (ZOSYN) 3,375 mg in dextrose 5 % 100 mL IVPB extended infusion (mini-bag)  3,375 mg IntraVENous Q8H Stew Lloyd MD   Stopped at 06/11/22 1030         REVIEW OF SYSTEMS:    As mentioned in HPI, all other systems negative. PHYSICAL EXAM:    Vitals:    BP (!) 171/91   Pulse 92   Temp 97.5 °F (36.4 °C) (Oral)   Resp 18   Ht 5' 3\" (1.6 m)   Wt 220 lb (99.8 kg)   SpO2 94%   BMI 38.97 kg/m²   Constitutional: The patient is awake, alert, and oriented. Skin: Warm and dry. No rashes were noted. No jaundice. HEENT: Eyes show round, and reactive pupils. Moist mucous membranes, no ulcerations, no thrush. Neck: Supple to movements. No lymphadenopathy. Chest: No use of accessory muscles to breathe. Symmetrical expansion. Auscultation reveals no wheezing, crackles, or rhonchi. Cardiovascular: S1 and S2 are rhythmic and regular. No murmurs appreciated. Abdomen: soft, induration at the pelvic area: no redness or pain on palpation. Extremities: No clubbing, no cyanosis, bilateral LE edema 2+  Musculoskeletal: no gross focal abnormalities  Neurological: alert, oriented x 3  Lines: peripheral      CBC+dif:  Recent Labs     06/09/22  0543 06/09/22  0543 06/10/22  0535 06/10/22  0535 06/11/22  0550   WBC 13.3*  --  11.6*  --  11.3   HGB 8.8*   < > 8.3*   < > 8.4*   HCT 26.3*   < > 25.2*   < > 25.9*   MCV 88.5   < > 88.0   < > 90.6      < > 265   < > 284   NEUTROABS 11.1*   < > 9.6*   < > 9.21*    < > = values in this interval not displayed.      Lab Results   Component Value Date    CRP 28.3 (H) 06/10/2022     No results found for: CRPHS  Lab Results   Component Value Date    SEDRATE 120 (H) 06/10/2022     Lab Results   Component Value Date    ALT 27 06/08/2022    AST 20 06/08/2022    ALKPHOS 190 (H) 06/08/2022    BILITOT 0.6 06/08/2022     Lab Results   Component Value Date     06/11/2022    K 4.3 06/11/2022    CL 95 06/11/2022    CO2 27 06/11/2022    BUN 10 06/11/2022    CREATININE 0.7 06/11/2022    GFRAA >60 06/11/2022    AGRATIO 0.6 06/08/2022    LABGLOM >60 06/11/2022    GLUCOSE 176 06/11/2022    PROT 6.4 06/08/2022    LABALBU 2.4 06/08/2022    CALCIUM 7.5 06/11/2022    BILITOT 0.6 06/08/2022    ALKPHOS 190 06/08/2022    AST 20 06/08/2022    ALT 27 06/08/2022       Lab Results   Component Value Date    PROTIME 15.1 06/10/2022    INR 1.4 06/10/2022       Lab Results   Component Value Date    TSH 1.210 10/04/2021       Lab Results   Component Value Date    NITRITE positive 09/16/2019    COLORU YELLOW 06/08/2022    COLORU Yellow 10/04/2021    PHUR 6.0 06/08/2022    WBCUA LARGE 06/08/2022    RBCUA MODERATE 06/08/2022    MUCUS SMALL 06/08/2022    BACTERIA Trace 06/08/2022    BACTERIA MANY 10/04/2021    CLARITYU Clear 10/04/2021    SPECGRAV 1.025 10/04/2021    LEUKOCYTESUR SMALL 10/04/2021    UROBILINOGEN NEGATIVE 06/08/2022    BILIRUBINUR NEGATIVE 06/08/2022    BILIRUBINUR neg 09/16/2019    BLOODU Negative 10/04/2021    GLUCOSEU 50 06/08/2022    AMORPHOUS FEW 06/08/2022       No results found for: JUA4UET, BEART, J9IVWWJF, PHART, THGBART, PHA1SDL, PO2ART, PPL5OAL  Radiology:  XR CHEST PORTABLE   Final Result   No acute process. IR INTERVENTIONAL RADIOLOGY PROCEDURE REQUEST    (Results Pending)      CT A/P 6/8:  1.  Abnormal gas is identified within pelvic soft tissues bilaterally in the groin as well as      caudal to the symphysis pubis.  Foci of gas are identified amidst soft tissue edema medial to the      left iliacus muscle (close to left external iliac vasculature).  Enlarged 2.5 cm left iliac chain      lymph nodes is suspected (with smaller lymph nodes not excluded).                 Microbiology:  Blood cx 6/8: Proteus mirabilis (pansusceptible)  Urine cx 6/8: proteus mirabilis  Blood cx 6/10: negative so far    Assessment:  · Proteus mirabilis bacteremia  · Proteus UTI    · Soft tissue ?air    · Leucocytosis:    Plan:    · Zosyn 3.375 grams IV q 8 hrs    · CT scan of abdomen and pelvis   · Surgery consult appreciated - no intervention planned for now         Electronically signed by Daniel Gu MD on 6/11/2022 at 12:17 PM

## 2022-06-11 NOTE — H&P
Haley Jj MD   Internal medicine   History and Physical      CHIEF COMPLAINT: Groin pain      HISTORY OF PRESENT ILLNESS:    Patient was seen on the floor earlier this morning at the main campus of [facility] with the nursing staff overnight  Data reviewed in detail  81year-old woman initially seen by PCP for left groin pain  Was treated with anti-inflammatories  She did not improve  Admitted at [facility] where she was diagnosed with pelvic abscess  Started on IV antibiotics  Family decided to transfer her over to main hospital at [facility]  She feels unbelievably better this morning  Almost has no pain  Tolerating medications  Currently on Zosyn  IR felt there was not enough fluid to drain  General surgery has seen the patient as well along with ID      Past Medical History:    Obesity  Type 2 diabetes mellitus  No past medical history on file. Past Surgical History:    No past surgical history on file. Medications Prior to Admission:    Medications Prior to Admission: methylPREDNISolone (MEDROL DOSEPACK) 4 MG tablet, Take by mouth. tiZANidine (ZANAFLEX) 2 MG tablet, Take 1 tablet by mouth nightly as needed (back pain) (Patient not taking: Reported on 6/10/2022)  pioglitazone (ACTOS) 30 MG tablet, TAKE 1 TABLET DAILY  Calcium Carb-Cholecalciferol (CALCIUM + D3) 600-200 MG-UNIT TABS tablet, Take 1 tablet by mouth daily   Multiple Vitamin (MULTI-VITAMIN DAILY) TABS, Take 1 tablet by mouth daily  aspirin 81 MG tablet, Take 81 mg by mouth daily  Turmeric 500 MG TABS, Take 1 tablet by mouth daily  (Patient not taking: Reported on 6/10/2022)    Allergies:    Patient has no known allergies. Social History:    reports that she has never smoked. She has never used smokeless tobacco. She reports that she does not drink alcohol and does not use drugs. Family History:   family history is not on file.     REVIEW OF SYSTEMS:  As above in the HPI, otherwise negative    PHYSICAL EXAM:    Vitals:  BP (!) 171/91   Pulse 92   Temp 97.5 °F (36.4 °C) (Oral)   Resp 18   Ht 5' 3\" (1.6 m)   Wt 220 lb (99.8 kg)   SpO2 94%   BMI 38.97 kg/m²     General:  Awake, alert, oriented X 3. Somewhat obese  No acute distress noted  HEENT:  Normocephalic, atraumatic. Pupils equal, round, reactive to light. No scleral icterus. No conjunctival injection. No nasal discharge. Neck:  Supple  Heart:  RRR, no murmurs, gallops, rubs  Lungs:  CTA bilaterally, bilat symmetrical expansion, no wheeze, rales, or rhonchi  Abdomen:   Bowel sounds present, soft, nontender, no masses, no organomegaly, no peritoneal signs  Minimal bilateral groin tenderness noted without obvious abscess  Extremities:  No clubbing, cyanosis, or edema  Skin:  Warm and dry, no open lesions or rash  Neuro:  Cranial nerves 2-12 intact, no focal deficits  Breast: deferred  Rectal: deferred  Genitalia:  deferred    LABS:  Data reviewed      ASSESSMENT:          Psoas muscle abscess ([address]  Clinically much better  No interventions required currently  Obesity  Type 2 diabetes mellitus      PLAN:  Antibiotics Zosyn per ID  DVT prophylaxis with Lovenox  Resume aspirin therapy and home medications  Questions answered to her satisfaction    Domingo Terrazas MD  2:10 PM  6/11/2022

## 2022-06-12 LAB
ALBUMIN SERPL-MCNC: 2.2 G/DL (ref 3.5–5.2)
ALP BLD-CCNC: 250 U/L (ref 35–104)
ALT SERPL-CCNC: 23 U/L (ref 0–32)
ANION GAP SERPL CALCULATED.3IONS-SCNC: 15 MMOL/L (ref 7–16)
AST SERPL-CCNC: 18 U/L (ref 0–31)
BASOPHILS ABSOLUTE: 0.04 E9/L (ref 0–0.2)
BASOPHILS RELATIVE PERCENT: 0.3 % (ref 0–2)
BILIRUB SERPL-MCNC: 0.6 MG/DL (ref 0–1.2)
BUN BLDV-MCNC: 9 MG/DL (ref 6–23)
CALCIUM SERPL-MCNC: 8.2 MG/DL (ref 8.6–10.2)
CHLORIDE BLD-SCNC: 96 MMOL/L (ref 98–107)
CO2: 24 MMOL/L (ref 22–29)
CREAT SERPL-MCNC: 0.7 MG/DL (ref 0.5–1)
EOSINOPHILS ABSOLUTE: 0.04 E9/L (ref 0.05–0.5)
EOSINOPHILS RELATIVE PERCENT: 0.3 % (ref 0–6)
GFR AFRICAN AMERICAN: >60
GFR NON-AFRICAN AMERICAN: >60 ML/MIN/1.73
GLUCOSE BLD-MCNC: 175 MG/DL (ref 74–99)
HCT VFR BLD CALC: 29.6 % (ref 34–48)
HEMOGLOBIN: 9.5 G/DL (ref 11.5–15.5)
IMMATURE GRANULOCYTES #: 0.2 E9/L
IMMATURE GRANULOCYTES %: 1.6 % (ref 0–5)
LYMPHOCYTES ABSOLUTE: 1.5 E9/L (ref 1.5–4)
LYMPHOCYTES RELATIVE PERCENT: 12.1 % (ref 20–42)
MCH RBC QN AUTO: 29.3 PG (ref 26–35)
MCHC RBC AUTO-ENTMCNC: 32.1 % (ref 32–34.5)
MCV RBC AUTO: 91.4 FL (ref 80–99.9)
METER GLUCOSE: 163 MG/DL (ref 74–99)
METER GLUCOSE: 166 MG/DL (ref 74–99)
METER GLUCOSE: 173 MG/DL (ref 74–99)
METER GLUCOSE: 189 MG/DL (ref 74–99)
METER GLUCOSE: 236 MG/DL (ref 74–99)
MONOCYTES ABSOLUTE: 0.53 E9/L (ref 0.1–0.95)
MONOCYTES RELATIVE PERCENT: 4.3 % (ref 2–12)
NEUTROPHILS ABSOLUTE: 10.1 E9/L (ref 1.8–7.3)
NEUTROPHILS RELATIVE PERCENT: 81.4 % (ref 43–80)
PDW BLD-RTO: 14.1 FL (ref 11.5–15)
PLATELET # BLD: 352 E9/L (ref 130–450)
PMV BLD AUTO: 9.2 FL (ref 7–12)
POTASSIUM SERPL-SCNC: 4.2 MMOL/L (ref 3.5–5)
RBC # BLD: 3.24 E12/L (ref 3.5–5.5)
SODIUM BLD-SCNC: 135 MMOL/L (ref 132–146)
TOTAL PROTEIN: 6.1 G/DL (ref 6.4–8.3)
WBC # BLD: 12.4 E9/L (ref 4.5–11.5)

## 2022-06-12 PROCEDURE — 2580000003 HC RX 258: Performed by: STUDENT IN AN ORGANIZED HEALTH CARE EDUCATION/TRAINING PROGRAM

## 2022-06-12 PROCEDURE — 82962 GLUCOSE BLOOD TEST: CPT

## 2022-06-12 PROCEDURE — 6360000002 HC RX W HCPCS: Performed by: NURSE PRACTITIONER

## 2022-06-12 PROCEDURE — 2140000000 HC CCU INTERMEDIATE R&B

## 2022-06-12 PROCEDURE — 80053 COMPREHEN METABOLIC PANEL: CPT

## 2022-06-12 PROCEDURE — 6360000002 HC RX W HCPCS: Performed by: STUDENT IN AN ORGANIZED HEALTH CARE EDUCATION/TRAINING PROGRAM

## 2022-06-12 PROCEDURE — 6370000000 HC RX 637 (ALT 250 FOR IP): Performed by: NURSE PRACTITIONER

## 2022-06-12 PROCEDURE — 85025 COMPLETE CBC W/AUTO DIFF WBC: CPT

## 2022-06-12 PROCEDURE — 2580000003 HC RX 258: Performed by: NURSE PRACTITIONER

## 2022-06-12 PROCEDURE — 2580000003 HC RX 258: Performed by: INTERNAL MEDICINE

## 2022-06-12 PROCEDURE — 36415 COLL VENOUS BLD VENIPUNCTURE: CPT

## 2022-06-12 PROCEDURE — 99232 SBSQ HOSP IP/OBS MODERATE 35: CPT | Performed by: SURGERY

## 2022-06-12 RX ORDER — HEPARIN SODIUM (PORCINE) LOCK FLUSH IV SOLN 100 UNIT/ML 100 UNIT/ML
3 SOLUTION INTRAVENOUS EVERY 12 HOURS SCHEDULED
Status: DISCONTINUED | OUTPATIENT
Start: 2022-06-12 | End: 2022-06-13 | Stop reason: HOSPADM

## 2022-06-12 RX ORDER — LIDOCAINE HYDROCHLORIDE 10 MG/ML
5 INJECTION, SOLUTION EPIDURAL; INFILTRATION; INTRACAUDAL; PERINEURAL ONCE
Status: DISCONTINUED | OUTPATIENT
Start: 2022-06-12 | End: 2022-06-13 | Stop reason: HOSPADM

## 2022-06-12 RX ORDER — HEPARIN SODIUM (PORCINE) LOCK FLUSH IV SOLN 100 UNIT/ML 100 UNIT/ML
3 SOLUTION INTRAVENOUS PRN
Status: DISCONTINUED | OUTPATIENT
Start: 2022-06-12 | End: 2022-06-13 | Stop reason: HOSPADM

## 2022-06-12 RX ORDER — SODIUM CHLORIDE 9 MG/ML
INJECTION, SOLUTION INTRAVENOUS PRN
Status: DISCONTINUED | OUTPATIENT
Start: 2022-06-12 | End: 2022-06-13 | Stop reason: HOSPADM

## 2022-06-12 RX ORDER — SODIUM CHLORIDE 0.9 % (FLUSH) 0.9 %
5-40 SYRINGE (ML) INJECTION EVERY 12 HOURS SCHEDULED
Status: DISCONTINUED | OUTPATIENT
Start: 2022-06-12 | End: 2022-06-13 | Stop reason: HOSPADM

## 2022-06-12 RX ORDER — SODIUM CHLORIDE 0.9 % (FLUSH) 0.9 %
5-40 SYRINGE (ML) INJECTION PRN
Status: DISCONTINUED | OUTPATIENT
Start: 2022-06-12 | End: 2022-06-13 | Stop reason: HOSPADM

## 2022-06-12 RX ADMIN — INSULIN LISPRO 1 UNITS: 100 INJECTION, SOLUTION INTRAVENOUS; SUBCUTANEOUS at 18:17

## 2022-06-12 RX ADMIN — PIPERACILLIN AND TAZOBACTAM 3375 MG: 3; .375 INJECTION, POWDER, LYOPHILIZED, FOR SOLUTION INTRAVENOUS at 06:16

## 2022-06-12 RX ADMIN — Medication 5 ML: at 08:47

## 2022-06-12 RX ADMIN — ENOXAPARIN SODIUM 40 MG: 100 INJECTION SUBCUTANEOUS at 08:38

## 2022-06-12 RX ADMIN — INSULIN LISPRO 1 UNITS: 100 INJECTION, SOLUTION INTRAVENOUS; SUBCUTANEOUS at 23:24

## 2022-06-12 RX ADMIN — Medication 10 ML: at 23:31

## 2022-06-12 RX ADMIN — SODIUM CHLORIDE, PRESERVATIVE FREE 10 ML: 5 INJECTION INTRAVENOUS at 23:31

## 2022-06-12 RX ADMIN — CALCIUM CARBONATE-VITAMIN D TAB 500 MG-200 UNIT 1 TABLET: 500-200 TAB at 08:38

## 2022-06-12 RX ADMIN — INSULIN LISPRO 1 UNITS: 100 INJECTION, SOLUTION INTRAVENOUS; SUBCUTANEOUS at 08:38

## 2022-06-12 RX ADMIN — PIPERACILLIN AND TAZOBACTAM 3375 MG: 3; .375 INJECTION, POWDER, LYOPHILIZED, FOR SOLUTION INTRAVENOUS at 15:00

## 2022-06-12 RX ADMIN — ASPIRIN 81 MG CHEWABLE TABLET 81 MG: 81 TABLET CHEWABLE at 08:38

## 2022-06-12 RX ADMIN — INSULIN LISPRO 2 UNITS: 100 INJECTION, SOLUTION INTRAVENOUS; SUBCUTANEOUS at 13:10

## 2022-06-12 ASSESSMENT — PAIN SCALES - GENERAL: PAINLEVEL_OUTOF10: 0

## 2022-06-12 NOTE — PROGRESS NOTES
Vicki Harrison MD   Internal medicine  Progress notes      CHIEF COMPLAINT: Groin pain      HISTORY OF PRESENT ILLNESS:    6/11/2022  Patient was seen on the floor earlier this morning at the main campus of [facility] with the nursing staff overnight  Data reviewed in detail  81year-old woman initially seen by PCP for left groin pain  Was treated with anti-inflammatories  She did not improve  Admitted at [facility] where she was diagnosed with pelvic abscess  Started on IV antibiotics  Family decided to transfer her over to main hospital at [facility]  She feels unbelievably better this morning  Almost has no pain  Tolerating medications  Currently on Zosyn  IR felt there was not enough fluid to drain  General surgery has seen the patient as well along with ID  6/12/2022  Patient was seen on the floor earlier this morning  Pain control adequate  Oral intake adequate  She feels fine this morning  Tolerating medications    Past Medical History:    Obesity  Type 2 diabetes mellitus  No past medical history on file. Past Surgical History:    No past surgical history on file. Medications Prior to Admission:    Medications Prior to Admission: methylPREDNISolone (MEDROL DOSEPACK) 4 MG tablet, Take by mouth. tiZANidine (ZANAFLEX) 2 MG tablet, Take 1 tablet by mouth nightly as needed (back pain) (Patient not taking: Reported on 6/10/2022)  pioglitazone (ACTOS) 30 MG tablet, TAKE 1 TABLET DAILY  Calcium Carb-Cholecalciferol (CALCIUM + D3) 600-200 MG-UNIT TABS tablet, Take 1 tablet by mouth daily   Multiple Vitamin (MULTI-VITAMIN DAILY) TABS, Take 1 tablet by mouth daily  aspirin 81 MG tablet, Take 81 mg by mouth daily  Turmeric 500 MG TABS, Take 1 tablet by mouth daily  (Patient not taking: Reported on 6/10/2022)    Allergies:    Patient has no known allergies. Social History:    reports that she has never smoked.  She has never used smokeless tobacco. She reports that she does not drink alcohol and does not use drugs. Family History:   family history is not on file. REVIEW OF SYSTEMS:  As above in the HPI, otherwise negative    PHYSICAL EXAM:    Vitals:  BP (!) 163/76   Pulse 89   Temp 98 °F (36.7 °C) (Oral)   Resp 16   Ht 5' 3\" (1.6 m)   Wt 220 lb (99.8 kg)   SpO2 100%   BMI 38.97 kg/m²     General:  Awake, alert, oriented X 3. Somewhat obese  No acute distress noted  HEENT:  Normocephalic, atraumatic. Pupils equal, round, reactive to light. No scleral icterus. No conjunctival injection. No nasal discharge. Neck:  Supple  Heart:  RRR, no murmurs, gallops, rubs  Lungs:  CTA bilaterally, bilat symmetrical expansion, no wheeze, rales, or rhonchi  Abdomen:   Bowel sounds present, soft, nontender, no masses, no organomegaly, no peritoneal signs  Minimal bilateral groin tenderness noted without obvious abscess  Extremities:  No clubbing, cyanosis, or edema  Skin:  Warm and dry, no open lesions or rash  Neuro:  Cranial nerves 2-12 intact, no focal deficits  Breast: deferred  Rectal: deferred  Genitalia:  deferred    LABS:  Data reviewed      ASSESSMENT:          Psoas muscle abscess (HCC)  Clinically much better  No interventions required currently  Obesity  Type 2 diabetes mellitus      PLAN:  PICC line for IV antibiotics Zosyn 2 to 3 weeks per ID  Likely discharge in the morning  DVT prophylaxis with Lovenox  Resume aspirin therapy and home medications  Questions answered to her satisfaction    Silvia Jaeger MD  2:17 PM  6/12/2022

## 2022-06-12 NOTE — PROGRESS NOTES
[facility]  ATTENDING PHYSICIAN PROGRESS NOTE     I have examined the patient and  reviewed the record. I have reviewed all relevant labs and imaging data. The following summarizes my clinical findings and independent assessment. CC: Psoas abscess    S. No fevers or chills. No abdominal pain and remains afebrile    O.  PHYSICAL EXAM   PSYCH: mood and affect normal, alert and oriented x 3  CONSTITUTIONAL: No apparent distress, comfortable  EYES: Sclera white, pupils equal round and reactive to light  Bloodstream infection:  Hearing normal, trachea midline, ears externally intact  RESP: Breath sounds were clear and equal with no rales, wheezes, or rhonchi. Respiratory effort was normal with no retractions or use of accessory muscles. CV: Heart sounds were normal with a regular rate and rhythm. No pedal edema  GI/ Abdomen: The abdomen was soft and non distended. There was no tenderness, guarding, rebound, or rigidity. There was no                     masses, hepatosplenomegaly       ASSESSMENT:  Principal Problem:    Abscess  Active Problems:    Psoas muscle abscess (HCC)  Resolved Problems:    * No resolved hospital problems. *       PLAN:  Psoas abscess from Proteus bloodstream infection  I spoke to the patient and her daughter at bedside. Explained to her that this was likely from hematogenous spread. And the Proteus likely started from her urinary tract infection  Because the abscess cavities are less than 3 cm, and when we asked IR for drainage and they were unable to, my recommendation is IV antibiotics. ID has been consulted and has a patient on Zosyn. Plan for several weeks  No plans for surgical intervention  We will sign off    DVT Proph: SCDS/Lovenox    Refer to discharge summary as part of the discharge planning. Zachery Angel MD, FACS  6/12/2022  1:00 PM    NOTE: This report was transcribed using voice recognition software. Every effort was made to ensure accuracy; however, inadvertent computerized transcription errors may be present.

## 2022-06-12 NOTE — PROGRESS NOTES
[address] [place] [address] [facility]    Progress Note     C/C : Proteus bacteremia, UTI     Pt denies fever and chills  Denies dysuria  Denies abdomen pain   Afebrile       Current Facility-Administered Medications   Medication Dose Route Frequency Provider Last Rate Last Admin    aspirin chewable tablet 81 mg  81 mg Oral Daily Dulcy Sera, APRN - CNP   81 mg at 06/12/22 0838    oyster shell calcium w/D 500-200 MG-UNIT tablet 1 tablet  1 tablet Oral Daily Dulcy Sera, APRN - CNP   1 tablet at 06/12/22 0838    0.9 % sodium chloride infusion   IntraVENous Continuous Dulcy Sera, APRN - CNP 75 mL/hr at 06/10/22 1026 New Bag at 06/10/22 1026    sodium chloride flush 0.9 % injection 5-40 mL  5-40 mL IntraVENous 2 times per day Dulcy Sera, APRN - CNP   5 mL at 06/12/22 0847    sodium chloride flush 0.9 % injection 10 mL  10 mL IntraVENous PRN Dulcy Sera, APRN - CNP        0.9 % sodium chloride infusion   IntraVENous PRN Dulcy Sera, APRN - CNP        ondansetron (ZOFRAN-ODT) disintegrating tablet 4 mg  4 mg Oral Q8H PRN Dulcy Sera, APRN - CNP        Or    ondansetron (ZOFRAN) injection 4 mg  4 mg IntraVENous Q6H PRN Dulcy Sera, APRN - CNP        polyethylene glycol (GLYCOLAX) packet 17 g  17 g Oral Daily PRN Dulcy Sera, APRN - CNP        acetaminophen (TYLENOL) tablet 650 mg  650 mg Oral Q6H PRN Dulcy Sera, APRN - CNP        Or    acetaminophen (TYLENOL) suppository 650 mg  650 mg Rectal Q6H PRN Dulcy Sera, APRN - CNP        enoxaparin (LOVENOX) injection 40 mg  40 mg SubCUTAneous Daily Dulcy Sera, APRN - CNP   40 mg at 06/12/22 0838    insulin lispro (HUMALOG) injection vial 0-6 Units  0-6 Units SubCUTAneous TID WC Dulcy Sera, APRN - CNP   1 Units at 06/12/22 0838    insulin lispro (HUMALOG) injection vial 0-3 Units  0-3 Units SubCUTAneous Nightly Dulcy Sera, APRN - CNP   1 Units at 06/11/22 2052    glucose chewable tablet 16 g  4 tablet Oral PRN Nando Gallon, APRN - CNP        dextrose bolus 10% 125 mL  125 mL IntraVENous PRN Nando Gallon, APRN - CNP        Or    dextrose bolus 10% 250 mL  250 mL IntraVENous PRN Nando Gallon, APRN - CNP        glucagon (rDNA) injection 1 mg  1 mg IntraMUSCular PRN Nando Gallon, APRN - CNP        dextrose 5 % solution  100 mL/hr IntraVENous PRN Nando Gallon, APRN - CNP        piperacillin-tazobactam (ZOSYN) 3,375 mg in dextrose 5 % 100 mL IVPB extended infusion (mini-bag)  3,375 mg IntraVENous Q8H Roshan Gardner MD   Stopped at 06/12/22 1043         REVIEW OF SYSTEMS:    As mentioned in HPI, all other systems negative. PHYSICAL EXAM:    Vitals:    BP (!) 163/76   Pulse 89   Temp 98 °F (36.7 °C) (Oral)   Resp 16   Ht 5' 3\" (1.6 m)   Wt 220 lb (99.8 kg)   SpO2 100%   BMI 38.97 kg/m²   Constitutional: The patient is awake, alert, and oriented. Skin: Warm and dry. No rashes were noted. No jaundice. HEENT: Eyes show round, and reactive pupils. Moist mucous membranes, no ulcerations, no thrush. Neck: Supple to movements. No lymphadenopathy. Chest: No use of accessory muscles to breathe. Symmetrical expansion. Auscultation reveals no wheezing, crackles, or rhonchi. Cardiovascular: S1 and S2 are rhythmic and regular. No murmurs appreciated. Abdomen: soft, induration at the pelvic area: no redness or pain on palpation. Extremities: No clubbing, no cyanosis, bilateral LE edema 2+  Musculoskeletal: no gross focal abnormalities  Neurological: alert, oriented x 3  Lines: peripheral      CBC+dif:  Recent Labs     06/10/22  0535 06/10/22  0535 06/11/22  0550 06/11/22  0550 06/12/22  0549   WBC 11.6*  --  11.3  --  12.4*   HGB 8.3*   < > 8.4*   < > 9.5*   HCT 25.2*   < > 25.9*   < > 29.6*   MCV 88.0   < > 90.6   < > 91.4      < > 284   < > 352   NEUTROABS 9.6*   < > 9.21*   < > 10.10*    < > = values in this interval not displayed.      Lab Results   Component Value Date    CRP 28.3 (H) 06/10/2022     No results found for: CRPHS  Lab Results   Component Value Date    SEDRATE 120 (H) 06/10/2022     Lab Results   Component Value Date    ALT 23 06/12/2022    AST 18 06/12/2022    ALKPHOS 250 (H) 06/12/2022    BILITOT 0.6 06/12/2022     Lab Results   Component Value Date     06/12/2022    K 4.2 06/12/2022    K 4.3 06/11/2022    CL 96 06/12/2022    CO2 24 06/12/2022    BUN 9 06/12/2022    CREATININE 0.7 06/12/2022    GFRAA >60 06/12/2022    AGRATIO 0.6 06/08/2022    LABGLOM >60 06/12/2022    GLUCOSE 175 06/12/2022    PROT 6.1 06/12/2022    LABALBU 2.2 06/12/2022    CALCIUM 8.2 06/12/2022    BILITOT 0.6 06/12/2022    ALKPHOS 250 06/12/2022    AST 18 06/12/2022    ALT 23 06/12/2022       Lab Results   Component Value Date    PROTIME 15.1 06/10/2022    INR 1.4 06/10/2022       Lab Results   Component Value Date    TSH 1.210 10/04/2021       Lab Results   Component Value Date    NITRITE positive 09/16/2019    COLORU YELLOW 06/08/2022    COLORU Yellow 10/04/2021    PHUR 6.0 06/08/2022    WBCUA LARGE 06/08/2022    RBCUA MODERATE 06/08/2022    MUCUS SMALL 06/08/2022    BACTERIA Trace 06/08/2022    BACTERIA MANY 10/04/2021    CLARITYU Clear 10/04/2021    SPECGRAV 1.025 10/04/2021    LEUKOCYTESUR SMALL 10/04/2021    UROBILINOGEN NEGATIVE 06/08/2022    BILIRUBINUR NEGATIVE 06/08/2022    BILIRUBINUR neg 09/16/2019    BLOODU Negative 10/04/2021    GLUCOSEU 50 06/08/2022    AMORPHOUS FEW 06/08/2022       No results found for: SJT7HEI, BEART, [id number], PHART, THGBART, XWQ0WBF, PO2ART, HCE7ZIB  Radiology:  CT ABDOMEN PELVIS W IV CONTRAST Additional Contrast? None   Final Result   1. Overall, mild progression of necrotizing soft tissue infection of the   anterior perineum with direct spread into the bilateral abductor brevis   muscles and cranially through the pubic symphysis into the retropubic space.    There are increased bilateral adductor brevis intramuscular fluid collections measuring up to 2.1 cm concerning for developing intramuscular abscesses. A   retropubic space loculated fluid and gas 2.5 cm collection has mildly   increased concerning for abscess. 2.  No significant change in left pelvic retroperitoneal involvement of   necrotizing soft tissue infection medial to the psoas muscle. However, this   appears to communicate with soft tissue swelling and enhancement with subtle   osseous destruction of the inferior left sacroiliac joint concerning for   osteomyelitis/septic arthritis. The findings were sent to the Radiology Results [address] at 2:19   pm on 6/11/2022 to be communicated to a licensed caregiver. XR CHEST PORTABLE   Final Result   No acute process. IR INTERVENTIONAL RADIOLOGY PROCEDURE REQUEST    (Results Pending)      CT A/P 6/8:  1.  Abnormal gas is identified within pelvic soft tissues bilaterally in the groin as well as      caudal to the symphysis pubis.  Foci of gas are identified amidst soft tissue edema medial to the      left iliacus muscle (close to left external iliac vasculature).  Enlarged 2.5 cm left iliac chain      lymph nodes is suspected (with smaller lymph nodes not excluded).                 Microbiology:  Blood cx 6/8: Proteus mirabilis (pansusceptible)  Urine cx 6/8: proteus mirabilis  Blood cx 6/10: negative so far    Assessment:  · Proteus mirabilis bacteremia  · Proteus UTI    · Soft tissue air    · Leucocytosis 12 K :    Plan:    · Zosyn 3.375 grams IV q 8 hrs    · PICC line insertion   · Home with IV abx for 2-3 weeks     Electronically signed by Sherrie Veronica MD on 6/12/2022 at 12:52 PM

## 2022-06-13 VITALS
HEIGHT: 63 IN | OXYGEN SATURATION: 100 % | WEIGHT: 220 LBS | TEMPERATURE: 98.1 F | RESPIRATION RATE: 19 BRPM | SYSTOLIC BLOOD PRESSURE: 171 MMHG | HEART RATE: 90 BPM | DIASTOLIC BLOOD PRESSURE: 72 MMHG | BODY MASS INDEX: 38.98 KG/M2

## 2022-06-13 LAB
ALBUMIN SERPL-MCNC: 2 G/DL (ref 3.5–5.2)
ALP BLD-CCNC: 197 U/L (ref 35–104)
ALT SERPL-CCNC: 19 U/L (ref 0–32)
ANION GAP SERPL CALCULATED.3IONS-SCNC: 13 MMOL/L (ref 7–16)
AST SERPL-CCNC: 17 U/L (ref 0–31)
BASOPHILS ABSOLUTE: 0.02 E9/L (ref 0–0.2)
BASOPHILS RELATIVE PERCENT: 0.2 % (ref 0–2)
BILIRUB SERPL-MCNC: 0.5 MG/DL (ref 0–1.2)
BUN BLDV-MCNC: 10 MG/DL (ref 6–23)
CALCIUM SERPL-MCNC: 7.8 MG/DL (ref 8.6–10.2)
CHLORIDE BLD-SCNC: 96 MMOL/L (ref 98–107)
CO2: 26 MMOL/L (ref 22–29)
CREAT SERPL-MCNC: 0.8 MG/DL (ref 0.5–1)
EOSINOPHILS ABSOLUTE: 0.03 E9/L (ref 0.05–0.5)
EOSINOPHILS RELATIVE PERCENT: 0.2 % (ref 0–6)
GFR AFRICAN AMERICAN: >60
GFR NON-AFRICAN AMERICAN: >60 ML/MIN/1.73
GLUCOSE BLD-MCNC: 165 MG/DL (ref 74–99)
HCT VFR BLD CALC: 25.6 % (ref 34–48)
HEMOGLOBIN: 8.2 G/DL (ref 11.5–15.5)
IMMATURE GRANULOCYTES #: 0.19 E9/L
IMMATURE GRANULOCYTES %: 1.5 % (ref 0–5)
LYMPHOCYTES ABSOLUTE: 1.32 E9/L (ref 1.5–4)
LYMPHOCYTES RELATIVE PERCENT: 10.7 % (ref 20–42)
MCH RBC QN AUTO: 29.2 PG (ref 26–35)
MCHC RBC AUTO-ENTMCNC: 32 % (ref 32–34.5)
MCV RBC AUTO: 91.1 FL (ref 80–99.9)
METER GLUCOSE: 169 MG/DL (ref 74–99)
METER GLUCOSE: 174 MG/DL (ref 74–99)
METER GLUCOSE: 192 MG/DL (ref 74–99)
MONOCYTES ABSOLUTE: 0.54 E9/L (ref 0.1–0.95)
MONOCYTES RELATIVE PERCENT: 4.4 % (ref 2–12)
NEUTROPHILS ABSOLUTE: 10.25 E9/L (ref 1.8–7.3)
NEUTROPHILS RELATIVE PERCENT: 83 % (ref 43–80)
PDW BLD-RTO: 14 FL (ref 11.5–15)
PLATELET # BLD: 341 E9/L (ref 130–450)
PMV BLD AUTO: 9.2 FL (ref 7–12)
POTASSIUM SERPL-SCNC: 3.7 MMOL/L (ref 3.5–5)
RBC # BLD: 2.81 E12/L (ref 3.5–5.5)
SODIUM BLD-SCNC: 135 MMOL/L (ref 132–146)
TOTAL PROTEIN: 5.4 G/DL (ref 6.4–8.3)
WBC # BLD: 12.4 E9/L (ref 4.5–11.5)

## 2022-06-13 PROCEDURE — 82962 GLUCOSE BLOOD TEST: CPT

## 2022-06-13 PROCEDURE — 97161 PT EVAL LOW COMPLEX 20 MIN: CPT

## 2022-06-13 PROCEDURE — 6360000002 HC RX W HCPCS: Performed by: INTERNAL MEDICINE

## 2022-06-13 PROCEDURE — 2580000003 HC RX 258: Performed by: INTERNAL MEDICINE

## 2022-06-13 PROCEDURE — C1751 CATH, INF, PER/CENT/MIDLINE: HCPCS

## 2022-06-13 PROCEDURE — 36415 COLL VENOUS BLD VENIPUNCTURE: CPT

## 2022-06-13 PROCEDURE — 02HV33Z INSERTION OF INFUSION DEVICE INTO SUPERIOR VENA CAVA, PERCUTANEOUS APPROACH: ICD-10-PCS | Performed by: INTERNAL MEDICINE

## 2022-06-13 PROCEDURE — 6360000002 HC RX W HCPCS: Performed by: STUDENT IN AN ORGANIZED HEALTH CARE EDUCATION/TRAINING PROGRAM

## 2022-06-13 PROCEDURE — 80053 COMPREHEN METABOLIC PANEL: CPT

## 2022-06-13 PROCEDURE — 2580000003 HC RX 258: Performed by: NURSE PRACTITIONER

## 2022-06-13 PROCEDURE — 6370000000 HC RX 637 (ALT 250 FOR IP): Performed by: NURSE PRACTITIONER

## 2022-06-13 PROCEDURE — 6360000002 HC RX W HCPCS: Performed by: NURSE PRACTITIONER

## 2022-06-13 PROCEDURE — 97530 THERAPEUTIC ACTIVITIES: CPT

## 2022-06-13 PROCEDURE — 36569 INSJ PICC 5 YR+ W/O IMAGING: CPT

## 2022-06-13 PROCEDURE — 2580000003 HC RX 258: Performed by: STUDENT IN AN ORGANIZED HEALTH CARE EDUCATION/TRAINING PROGRAM

## 2022-06-13 PROCEDURE — 76937 US GUIDE VASCULAR ACCESS: CPT

## 2022-06-13 PROCEDURE — 85025 COMPLETE CBC W/AUTO DIFF WBC: CPT

## 2022-06-13 RX ORDER — ONDANSETRON 4 MG/1
4 TABLET, ORALLY DISINTEGRATING ORAL EVERY 8 HOURS PRN
Qty: 30 TABLET | Refills: 0 | Status: SHIPPED | OUTPATIENT
Start: 2022-06-13 | End: 2022-07-12

## 2022-06-13 RX ADMIN — ERTAPENEM SODIUM 1000 MG: 1 INJECTION, POWDER, LYOPHILIZED, FOR SOLUTION INTRAMUSCULAR; INTRAVENOUS at 15:55

## 2022-06-13 RX ADMIN — PIPERACILLIN AND TAZOBACTAM 3375 MG: 3; .375 INJECTION, POWDER, LYOPHILIZED, FOR SOLUTION INTRAVENOUS at 11:56

## 2022-06-13 RX ADMIN — ENOXAPARIN SODIUM 40 MG: 100 INJECTION SUBCUTANEOUS at 10:22

## 2022-06-13 RX ADMIN — CALCIUM CARBONATE-VITAMIN D TAB 500 MG-200 UNIT 1 TABLET: 500-200 TAB at 10:22

## 2022-06-13 RX ADMIN — INSULIN LISPRO 1 UNITS: 100 INJECTION, SOLUTION INTRAVENOUS; SUBCUTANEOUS at 12:30

## 2022-06-13 RX ADMIN — PIPERACILLIN AND TAZOBACTAM 3375 MG: 3; .375 INJECTION, POWDER, LYOPHILIZED, FOR SOLUTION INTRAVENOUS at 00:31

## 2022-06-13 RX ADMIN — ASPIRIN 81 MG CHEWABLE TABLET 81 MG: 81 TABLET CHEWABLE at 10:22

## 2022-06-13 RX ADMIN — SODIUM CHLORIDE: 9 INJECTION, SOLUTION INTRAVENOUS at 09:53

## 2022-06-13 ASSESSMENT — PAIN SCALES - GENERAL: PAINLEVEL_OUTOF10: 0

## 2022-06-13 NOTE — DISCHARGE SUMMARY
[facility]   Discharge summary   Patient ID:  Mirian Fisher  [id number]  81 y.o.  1940    Admit date: 6/10/2022    Discharge date and time: 6/13/2022    Admission Diagnoses:   Patient Active Problem List   Diagnosis    Type 2 diabetes mellitus without complication, without long-term current use of insulin (HCC)    Other hyperlipidemia    Osteopenia of multiple sites    Immunization due    Morbidly obese ([address]    Chronic renal disease, stage III ([address] [444048]    Abscess    Psoas muscle abscess ([facility]       Discharge Diagnoses: psoas muscle abscess, Proteus mirabilis bacteremia and UTI    Consults: Infectious disease    Procedures: IV antibiotics    Hospital Course:   71-year-old  woman admitted for Proteus mirabilis bacteremia thought to have been source from urinary tract infection along with probable soft tissue abscess. White count is significantly improved. Patient will be discharged home on IV Invanz per ID recommendations for 2 to 3 weeks. Outpatient CT scan to be followed up on. Patient has had PICC line inserted for ongoing antibiotic therapy. From medicine standpoint cleared for discharge. Follow-up with PCP within 1 week. Family wishes to have physical therapy occupational therapy done as an outpatient  . Recent Labs     06/11/22  0550 06/12/22  0549 06/13/22  0445   WBC 11.3 12.4* 12.4*   HGB 8.4* 9.5* 8.2*   HCT 25.9* 29.6* 25.6*    352 341       Recent Labs     06/11/22  0550 06/12/22  0549 06/13/22  0445    135 135   K 4.3 4.2 3.7   CL 95* 96* 96*   CO2 27 24 26   BUN 10 9 10   CREATININE 0.7 0.7 0.8   CALCIUM 7.5* 8.2* 7.8*       CT ABDOMEN PELVIS W IV CONTRAST Additional Contrast? None    Result Date: 6/11/2022  EXAMINATION: CT OF THE ABDOMEN AND PELVIS WITH CONTRAST 6/11/2022 11:41 am TECHNIQUE: CT of the abdomen and pelvis was performed with the administration of intravenous contrast. Multiplanar reformatted images are provided for review. Automated exposure control, iterative reconstruction, and/or weight based adjustment of the mA/kV was utilized to reduce the radiation dose to as low as reasonably achievable. Contrast amount: 90 ml Isovue-370. Dose: Total Exam DLP: 1047.63 mGy*cm COMPARISON: CT abdomen and pelvis without contrast 06/08/2022 HISTORY: ORDERING SYSTEM PROVIDED HISTORY: Pelvic infection TECHNOLOGIST PROVIDED HISTORY: Reason for exam:->Pelvic infection Additional Contrast?->None What reading provider will be dictating this exam?->CRC FINDINGS: Lower Chest: Compared to the prior exam, there are new bilateral trace pleural effusions with associated subsegmental atelectasis. Organs: Left hepatic 3.4 cm cyst is noted. Additional smaller hepatic cysts are seen. No concerning hepatic lesion. Portal vein is patent. No biliary ductal dilatation. The gallbladder is unremarkable. The spleen, pancreas and adrenal glands are unremarkable. No hydronephrosis is seen. A left renal 10 mm calculus is unchanged. Stable bilateral perinephric stranding is noted. GI/Bowel: Stomach and duodenal sweep demonstrate no acute abnormality. There is no evidence of bowel obstruction. No evidence of abnormal bowel wall thickening or distension. There is diverticulosis without evidence of diverticulitis. Pelvis:  Bladder is unremarkable in appearance. A couple uterine fibroids are noted. No adnexal mass. Compared to the prior exam, the previously seen extensive soft tissue gas and inflammation in the anterior perineum inferior to the pubic symphysis has mildly increased in extent. There is direct spread of the gas and inflammation into the bilateral abductor brevis muscles. Compared to the prior exam there is increased pockets of intramuscular fluid with peripheral enhancement concerning for developing abscesses. For example a pocket in the left lateral abductor brevis muscle measures approximately 2.1 x 2.0 x 2.0 cm.   There is mild irregularity of the pubic symphysis itself, which is not significantly changed, but there is some gas in the articular space. There is direct extension of the pubic symphysis infection into the extraperitoneal retropubic space with increase in size of a loculated fluid and gas collection measuring 2.0 x 2.5 x 2.4 cm. There is increased gas in the vaginal cuff, but no significant associated inflammatory change is noted and this may be incidental.  The previously seen localized left retroperitoneal gas, fluid and inflammatory change medial to the psoas muscle has not significantly changed. This does not appear to be a well-formed abscess. This area of infection appears to communicate with localized soft tissue swelling and inflammation associated with the inferior aspect of the left sacroiliac joint. There is some associated cortical loss and irregularity of the left iliac bone at the articulation. Peritoneum/Retroperitoneum: No significant ascites is identified. No free air is visualized. No enlarged lymph nodes are identified. Abdominal aorta is normal in caliber. Bones/Soft Tissues: No acute fracture is identified. Irregularity of the pubic symphysis and the inferior left sacroiliac joint, as described above. 1.  Overall, mild progression of necrotizing soft tissue infection of the anterior perineum with direct spread into the bilateral abductor brevis muscles and cranially through the pubic symphysis into the retropubic space. There are increased bilateral adductor brevis intramuscular fluid collections measuring up to 2.1 cm concerning for developing intramuscular abscesses. A retropubic space loculated fluid and gas 2.5 cm collection has mildly increased concerning for abscess. 2.  No significant change in left pelvic retroperitoneal involvement of necrotizing soft tissue infection medial to the psoas muscle.   However, this appears to communicate with soft tissue swelling and enhancement with subtle osseous destruction of the inferior left sacroiliac joint concerning for osteomyelitis/septic arthritis. The findings were sent to the Radiology Results [address] at 2:19 pm on 6/11/2022 to be communicated to a licensed caregiver. XR CHEST PORTABLE    Result Date: 6/10/2022  EXAMINATION: ONE XRAY VIEW OF THE CHEST 6/10/2022 11:26 am COMPARISON: CT abdomen and pelvis without 09/08/2022 HISTORY: ORDERING SYSTEM PROVIDED HISTORY: hypoxia TECHNOLOGIST PROVIDED HISTORY: Reason for exam:->hypoxia What reading provider will be dictating this exam?->CRC FINDINGS: The lungs are without acute focal process. There is no effusion or pneumothorax. The cardiomediastinal silhouette is without acute process. The osseous structures are without acute process. No acute process. Discharge Exam:    HEENT: NCAT,  PERRLA, No JVD  Heart:  RRR, no murmurs, gallops, or rubs. Lungs:  CTA bilaterally, no wheeze, rales or rhonchi  Abd: bowel sounds present, nontender, nondistended, no masses  Extrem:  No clubbing, cyanosis, or edema    Disposition: home     Patient Condition at Discharge: Stable    Patient Instructions:      Medication List      START taking these medications    ertapenem  infusion  Commonly known as: INVanz  Infuse 1,000 mg intravenously every 24 hours for 21 days For at least 3 weeks     Re : Proteus bacteremia, soft tissue infection ( pelvic)     ondansetron 4 MG disintegrating tablet  Commonly known as: ZOFRAN-ODT  Take 1 tablet by mouth every 8 hours as needed for Nausea or Vomiting        CONTINUE taking these medications    aspirin 81 MG tablet     Calcium + D3 600-200 MG-UNIT Tabs tablet     methylPREDNISolone 4 MG tablet  Commonly known as: MEDROL DOSEPACK  Take by mouth.      Multi-Vitamin Daily Tabs     pioglitazone 30 MG tablet  Commonly known as: ACTOS  TAKE 1 TABLET DAILY     tiZANidine 2 MG tablet  Commonly known as: ZANAFLEX  Take 1 tablet by mouth nightly as needed (back pain)     Turmeric 500 MG Tabs           Where to Get Your Medications      These medications were sent to [address] [place] [address] [phone]  [address] [place] [id number]    Phone: [phone]   · ondansetron 4 MG disintegrating tablet     You can get these medications from any pharmacy    Bring a paper prescription for each of these medications  · ertapenem  infusion       Activity: activity as tolerated  Diet: regular diet    Pt has been advised to: Follow-up with Baylee Mauricio MD in 1 week.   Follow-up with consultants as recommended by them    Note that over 30 minutes was spent in preparing discharge papers, discussing discharge with patient, medication review, etc.    Signed:  Danna Guzman MD  6/13/2022  4:14 PM

## 2022-06-13 NOTE — PROGRESS NOTES
induration at the pelvic area: no redness or pain on palpation. Extremities: No clubbing, no cyanosis, bilateral LE edema 2+  Musculoskeletal: no gross focal abnormalities  Neurological: alert, oriented x 3  Lines: peripheral      CBC+dif:  Recent Labs     06/11/22  0550 06/11/22  0550 06/12/22  0549 06/12/22  0549 06/13/22  0445   WBC 11.3  --  12.4*  --  12.4*   HGB 8.4*   < > 9.5*   < > 8.2*   HCT 25.9*   < > 29.6*   < > 25.6*   MCV 90.6   < > 91.4   < > 91.1      < > 352   < > 341   NEUTROABS 9.21*   < > 10.10*   < > 10.25*    < > = values in this interval not displayed.      Lab Results   Component Value Date    CRP 28.3 (H) 06/10/2022     No results found for: CRP  Lab Results   Component Value Date    SEDRATE 120 (H) 06/10/2022     Lab Results   Component Value Date    ALT 19 06/13/2022    AST 17 06/13/2022    ALKPHOS 197 (H) 06/13/2022    BILITOT 0.5 06/13/2022     Lab Results   Component Value Date     06/13/2022    K 3.7 06/13/2022    K 4.3 06/11/2022    CL 96 06/13/2022    CO2 26 06/13/2022    BUN 10 06/13/2022    CREATININE 0.8 06/13/2022    GFRAA >60 06/13/2022    AGRATIO 0.6 06/08/2022    LABGLOM >60 06/13/2022    GLUCOSE 165 06/13/2022    PROT 5.4 06/13/2022    LABALBU 2.0 06/13/2022    CALCIUM 7.8 06/13/2022    BILITOT 0.5 06/13/2022    ALKPHOS 197 06/13/2022    AST 17 06/13/2022    ALT 19 06/13/2022       Lab Results   Component Value Date    PROTIME 15.1 06/10/2022    INR 1.4 06/10/2022       Lab Results   Component Value Date    TSH 1.210 10/04/2021       Lab Results   Component Value Date    NITRITE positive 09/16/2019    COLORU YELLOW 06/08/2022    COLORU Yellow 10/04/2021    PHUR 6.0 06/08/2022    WBCUA LARGE 06/08/2022    RBCUA MODERATE 06/08/2022    MUCUS SMALL 06/08/2022    BACTERIA Trace 06/08/2022    BACTERIA MANY 10/04/2021    CLARITYU Clear 10/04/2021    SPECGRAV 1.025 10/04/2021    LEUKOCYTESUR SMALL 10/04/2021    UROBILINOGEN NEGATIVE 06/08/2022    BILIRUBINUR NEGATIVE 06/08/2022    BILIRUBINUR neg 09/16/2019    BLOODU Negative 10/04/2021    GLUCOSEU 50 06/08/2022    AMORPHOUS FEW 06/08/2022       No results found for: BKY1IZK, BEART, I7TYOXWH, PHART, THGBART, TEE0YSI, PO2ART, LOY7QXG  Radiology:  CT ABDOMEN PELVIS W IV CONTRAST Additional Contrast? None   Final Result   1. Overall, mild progression of necrotizing soft tissue infection of the   anterior perineum with direct spread into the bilateral abductor brevis   muscles and cranially through the pubic symphysis into the retropubic space. There are increased bilateral adductor brevis intramuscular fluid collections   measuring up to 2.1 cm concerning for developing intramuscular abscesses. A   retropubic space loculated fluid and gas 2.5 cm collection has mildly   increased concerning for abscess. 2.  No significant change in left pelvic retroperitoneal involvement of   necrotizing soft tissue infection medial to the psoas muscle. However, this   appears to communicate with soft tissue swelling and enhancement with subtle   osseous destruction of the inferior left sacroiliac joint concerning for   osteomyelitis/septic arthritis. The findings were sent to the Radiology Results [address] at 2:19   pm on 6/11/2022 to be communicated to a licensed caregiver. XR CHEST PORTABLE   Final Result   No acute process. IR INTERVENTIONAL RADIOLOGY PROCEDURE REQUEST    (Results Pending)      CT A/P 6/8:  1.  Abnormal gas is identified within pelvic soft tissues bilaterally in the groin as well as      caudal to the symphysis pubis.  Foci of gas are identified amidst soft tissue edema medial to the      left iliacus muscle (close to left external iliac vasculature).  Enlarged 2.5 cm left iliac chain      lymph nodes is suspected (with smaller lymph nodes not excluded).                 Microbiology:  Blood cx 6/8: Proteus mirabilis (pansusceptible)  Urine cx 6/8: proteus mirabilis  Blood cx 6/10: negative so far    Assessment:  · Proteus mirabilis bacteremia  · Proteus UTI    · Soft tissue air    · Leucocytosis 12 K :    Plan:    · Zosyn to invanz 1 gram IV q 24 hrs for 2-3 weeks   · Follow up CT scan out pt basis   · PICC line insertion       Electronically signed by Coco Payton MD on 6/13/2022 at 12:13 PM

## 2022-06-13 NOTE — PROGRESS NOTES
PICC Placement 6/13/2022    Product number: [id number]   Lot Number: [id number]      Ultrasound: yes   Left Basilic vein:                Upper Arm Circumference: 39cm    Size: 4.5f    Exposed Length: 2cm    Internal Length: 47cm   Cut: 6cm   Vein Measurement: .SHAMIR Dejesus  6/13/2022  3:44 PM                       JUANCHO AGUILAR

## 2022-06-13 NOTE — PLAN OF CARE
Problem: Chronic Conditions and Co-morbidities  Goal: Patient's chronic conditions and co-morbidity symptoms are monitored and maintained or improved  Outcome: Progressing     Problem: Discharge Planning  Goal: Discharge to home or other facility with appropriate resources  Outcome: Progressing     Problem: Pain  Goal: Verbalizes/displays adequate comfort level or baseline comfort level  Outcome: Progressing     Problem: Skin/Tissue Integrity  Goal: Absence of new skin breakdown  Description: 1. Monitor for areas of redness and/or skin breakdown  2. Assess vascular access sites hourly  3. Every 4-6 hours minimum:  Change oxygen saturation probe site  4. Every 4-6 hours:  If on nasal continuous positive airway pressure, respiratory therapy assess nares and determine need for appliance change or resting period.   Outcome: Progressing     Problem: Safety - Adult  Goal: Free from fall injury  Outcome: Progressing  Flowsheets (Taken 6/13/2022 0220 by Danny Dejesus RN)  Free From Fall Injury: Instruct family/caregiver on patient safety     Problem: ABCDS Injury Assessment  Goal: Absence of physical injury  Outcome: Progressing  Flowsheets (Taken 6/13/2022 0220 by Danny Dejesus RN)  Absence of Physical Injury: Implement safety measures based on patient assessment

## 2022-06-13 NOTE — PROGRESS NOTES
OT SESSION ATTEMPT     Date:2022  Patient Name: Gloria Sharp  MRN: [id number]  : 1940  Room: [address] [place] [address]-A     Occupational therapy orders received/chart review completed and OT session attempted this date. Pt unavailable at this time d/t having PICC line placed. Will reattempt OT eval at a later time/date.     Kenan Butterfield, [address] OTR/L #[id number]

## 2022-06-13 NOTE — PROGRESS NOTES
Physical Therapy  Physical Therapy Initial Assessment     Name: Gloria Sharp  : 1940  MRN: [id number]      Date of Service: 2022    Evaluating PT:  Davian Barbour., PT, DPT [id number]    Room #:  3062/7524-O  Diagnosis:  Abscess [L02.91]  PMHx/PSHx:  none  Procedure/Surgery:  none  Precautions:  Falls, IV  Equipment Needs:  none    SUBJECTIVE:    Pt lives with  in 1 story home, [address]. Pt independent prior to admission. OBJECTIVE:   Initial Evaluation  Date: 22 Treatment Short Term/ Long Term   Goals   AM-PAC 6 Clicks 42/38     Was pt agreeable to Eval/treatment? yes     Does pt have pain? Reports pain and tightness in pelvic region     Bed Mobility  Rolling: NT  Supine to sit: NT  Sit to supine: NT  Scooting: NT  Rolling: mod I   Supine to sit: mod i  Sit to supine: Mod i  Scooting: Mod i   Transfers Sit to stand: SBA  Stand to sit: SBA  Stand pivot: SBA  Sit to stand: mod i  Stand to sit:  Mod i  Stand pivot: Mod i   Ambulation    10', UE on IV pole, CGA  50', ww, SBA  >150', LRAD, mod i   Stair negotiation: ascended and descended NT  4 steps, HR, mod i   ROM BUE:  WFL  BLE:  WFL     Strength BUE:  WFL  BLE:  WFL  -   Balance Sitting EOB:  IND  Dynamic Standing:  SBA  Sitting EOB:  -  Dynamic Standing: Mod i     Pt is A & O x 3, oriented to person, place, and date  Sensation:  Pt denies numbness and tingling to extremities  Edema:  unremarkable    Vitals: VSS per RN    Therapeutic Exercises:    1. Seated marches- seated, x5 reps  2. Ankle pumps- seated, x5 reps  3. LAQ- seated, x5 reps  4. Hip add ISO- seated, x5 reps  5.  Hip ABD- seated, x5 reps    Patient education  Pt educated on role of PT, tx, gait, balance, TE, current status and POC, d/c planning    Patient response to education:   Pt verbalized understanding Pt demonstrated skill Pt requires further education in this area   yes partial All areas     ASSESSMENT:    Conditions Requiring Skilled Therapeutic Intervention:    [x]Decreased strength     []Decreased ROM  [x]Decreased functional mobility  [x]Decreased balance   [x]Decreased endurance   []Decreased posture  []Decreased sensation  []Decreased coordination   []Decreased vision  [x]Decreased safety awareness   [x]Increased pain       Comments:  Pt seated bedside upon arrival agreeable to therapy session, RN clearance provided. Pt showing antalgic gait pattern and mild unsteadiness when amb with UE supported at IV pole. Provided pt with ww, pt showing improved tolerance to amb, however still showing slow pace and slight antalgic pattern. Pt describes pain as muscle tightness at pelvis. Provided pt with seated TE to help with mobility and strengthening hip musculature. Anticipate quick progress with better pain control. Will continue to follow POC, progressing towards goals outlined. Treatment:  Patient practiced and was instructed in the following treatment:     TE- see list above   amb- cues for improving stance time symmetrically during gait, education on AD use, increased time and effort to complete  Pt's/ family goals   1. Return home    Prognosis is good for reaching above PT goals. Patient and or family understand(s) diagnosis, prognosis, and plan of care. yes    PHYSICAL THERAPY PLAN OF CARE:    PT POC is established based on physician order and patient diagnosis     Referring provider/PT Order:    Start   Ordering Provider    06/10/22 1015  PT evaluation and treat  Start:  06/10/22 1015,   End:  06/10/22 1015,   ONE TIME,   Standing Count:  1 Occurrences,   R         CAROL Whiteside - CNP        Diagnosis:  Abscess [L02.91]  Specific instructions for next treatment:  Progress independence with tx, gait, and stair negotiation as pt tolerates.      Current Treatment Recommendations:     [x] Strengthening to improve independence with functional mobility   [] ROM to improve independence with functional mobility   [x] Balance Training to improve static/dynamic balance and to reduce fall risk  [x] Endurance Training to improve activity tolerance during functional mobility   [x] Transfer Training to improve safety and independence with all functional transfers   [x] Gait Training to improve gait mechanics, endurance and assess need for appropriate assistive device  [x] Stair Training in preparation for safe discharge home and/or into the community   [] Positioning to prevent skin breakdown and contractures  [] Safety and Education Training   [x] Patient/Caregiver Education   [x] HEP  [] Other     PT long term treatment goals are located in above grid    Frequency of treatments: 2-5x/week x 1-2 weeks. Time in  1127  Time out  1146    Total Treatment Time  9 minutes     Evaluation Time includes thorough review of current medical information, gathering information on past medical history/social history and prior level of function, completion of standardized testing/informal observation of tasks, assessment of data and education on plan of care and goals.     CPT codes:  [x] Low Complexity PT evaluation 61452  [] Moderate Complexity PT evaluation 28338  [] High Complexity PT evaluation 70710  [] PT Re-evaluation 99329  [] Gait training 70368 0 minutes  [] Manual therapy 28213 0 minutes  [x] Therapeutic activities 44224 9 minutes  [] Therapeutic exercises 58770 0 minutes  [] Neuromuscular reeducation 31265 0 minutes     Nishant Wong., PT, DPT  [id number]

## 2022-06-13 NOTE — CARE COORDINATION
Received a return call from [facility]. Referral faxed to [phone] and she will call back with patient's appointment time. Received a call from patient's daughter, Vic Barnes requesting a referral for outpatient physical therapy. [address] 3970 to check the status of the referral. Patient scheduled for tomorrow at 3P at [facility] and must arrive at 2:45P. Informed patient and daughter, Palma Lang.     Chuck Alvarez, MSW, LSW [phone]

## 2022-06-13 NOTE — CARE COORDINATION
Met with patient at bedside for transition of care planning. Patient lives in a home with her , is independent without a device and has no home DME. No hx of home care or LASHAWN. PCP is Dr. Prema Carballo and uses [facility] ([facility]). Plan is home, she reports no homegoing needs and  to transport home. Discussed plan with ID and patient at bedside, patient to d/c home on Invanz 1000 mg daily for 21 days and be set up at [facility]; patient agreeable. Call made to [facility] at [phone], no answer; left message to return the call. The Plan for Transition of Care is related to the following treatment goals: discharge planning    The Patient and/or patient representative Marchia Opitz was provided with a choice of provider and agrees   with the discharge plan. [x] Yes [] No    Freedom of choice list was provided with basic dialogue that supports the patient's individualized plan of care/goals, treatment preferences and shares the quality data associated with the providers.  [x] Yes [] No    Eliazar Bach, MSW, LSW [phone]

## 2022-06-14 ENCOUNTER — CARE COORDINATION (OUTPATIENT)
Dept: CASE MANAGEMENT | Age: 82
End: 2022-06-14

## 2022-06-14 DIAGNOSIS — L02.91 ABSCESS: Primary | ICD-10-CM

## 2022-06-14 PROCEDURE — 1111F DSCHRG MED/CURRENT MED MERGE: CPT | Performed by: FAMILY MEDICINE

## 2022-06-14 NOTE — CARE COORDINATION
[facility] Transitions Initial Follow Up Call    Call within 2 business days of discharge: Yes    Patient: Andria Holt Patient : 1940   MRN: [id number]  Reason for Admission: Psoas Muscle Abscess  Discharge Date: 22 RARS: Readmission Risk Score: 11.6 ( )      Last Discharge [facility]       Complaint Diagnosis Description Type Department Provider    6/10/22  Psoas muscle abscess [facility]) Admission (Discharged) Jacob Patton MD        Transitions of Care Initial Call    Was this an external facility discharge? No Discharge Facility: [facility]    Challenges to be reviewed by the provider   Additional needs identified to be addressed with provider: No           Method of communication with provider : chart routing    Advance Care Planning:   Does patient have an Advance Directive: decision maker updated. Care Transition Nurse contacted the patient by telephone to perform post hospital discharge assessment. Verified name and  with family as identifiers. Provided introduction to self, and explanation of the CTN role. Patient gave permission to her daughter Cele Cevallos to speak with this CTN. CTN reviewed discharge instructions, medical action plan and red flags with family who verbalized understanding. Family given an opportunity to ask questions and does not have any further questions or concerns at this time. Were discharge instructions available to patient? Reviewed with family. Reviewed appropriate site of care based on symptoms and resources available to patient including: PCP  Specialist  When to call 911. The family agrees to contact the PCP office for questions related to their healthcare. Medication reconciliation was performed with family, who verbalizes understanding of administration of home medications.   -[id number] entered        CTN provided contact information. Plan for follow-up call in 7-10 days based on severity of symptoms and risk factors.   Plan for next call: symptom management-symptom assessment   Verify appointment scheduled with PCP. Check is patient was able to obtain [facility]. Care Transitions 24 Hour Call    Do you have a copy of your discharge instructions?: Yes  Do you have all of your prescriptions and are they filled?: Yes  Have you scheduled your follow up appointment?: No (Comment: CTN will forward to office of PCP for appointment scheduling)  Do you feel like you have everything you need to keep you well at home?: Yes  Care Transitions Interventions  No Identified Needs    Eva is pleasant in conversation and provides patient update.   -patient offers C/O stiffness in lower back   -denies patient with fever, chills, shortness of breath, or chest discomfort  -BLE edema unchanged   -reports decreased appetite   -reports normal bladder/bowel elimination   -ambulates with a walker   -able to afford cost of medications   -family provides transportation as needed     Emotional support provided; discussed will continue to follow.        Follow Up  Future Appointments   Date Time Provider Naif Lancaster   6/22/2022  9:00 AM Marisabel Watters MD [facility] INF   9/27/2022  8:00 AM MD Sara Petty RN

## 2022-06-14 NOTE — TELEPHONE ENCOUNTER
Called and lm w/ pts dtr to have pt or her  call in and sched a hosp f/u. I did inform her daughter that we can only speak to pt or her  Ashu Dutton. He is the only person listed on her communication form.

## 2022-06-15 LAB
BLOOD CULTURE, ROUTINE: NORMAL
CULTURE, BLOOD 2: NORMAL

## 2022-06-15 NOTE — PROGRESS NOTES
Physician Progress Note      PATIENT:               Maeve Mejia  CSN #:                  [id number]  :                       1940  ADMIT DATE:       6/10/2022 9:48 AM  DISCH DATE:        2022 6:30 PM  RESPONDING  PROVIDER #:        ANGELICA Henry MD          QUERY TEXT:    Pt admitted with groin pain and found to have Psoas muscle abscess. Pt noted   to have WBC trending up to 12.4 with CRP 28.3. If possible, please document in   the progress notes and discharge summary if you are evaluating and /or   treating any of the following: The medical record reflects the following:  Risk Factors: Psoas muscle abscess  Clinical Indicators: Lab findings WBC 11.6, 11.3, 12.4, CRP 28.3  VS findings   97.8, 88, 18, 187/87, 98%RA 97.9, 99, 18, 166/75, 100%RA per general surgery   progress note Psoas abscess from Proteus bloodstream infection Per ID soft   tissue infection pelvic area proteus UTI for PICC line insertion  Treatment: Surgical and ID consults, IV antibiotics, planned PICC line   insertion    Thank you  Venessa PIKEN, RN, CCDS  Clinical Documentation Improvement  Options provided:  -- Sepsis, present on admission  -- Sepsis, present on admission, now resolved  -- Sepsis, not present on admission  -- Psoas muscle abscess without Sepsis  -- Other - I will add my own diagnosis  -- Disagree - Not applicable / Not valid  -- Disagree - Clinically unable to determine / Unknown  -- Refer to Clinical Documentation Reviewer    PROVIDER RESPONSE TEXT:    This patient was treated for sepsis this admission, which was present on   admission and is currently resolved.     Query created by: Junior Cole on  38:82 AM      Electronically signed by:  ANGELICA Henry MD 6/15/2022 9:04 AM

## 2022-06-17 ENCOUNTER — TELEPHONE (OUTPATIENT)
Dept: FAMILY MEDICINE CLINIC | Age: 82
End: 2022-06-17

## 2022-06-17 ENCOUNTER — OFFICE VISIT (OUTPATIENT)
Dept: FAMILY MEDICINE CLINIC | Age: 82
End: 2022-06-17
Payer: MEDICARE

## 2022-06-17 VITALS
DIASTOLIC BLOOD PRESSURE: 80 MMHG | TEMPERATURE: 97.8 F | RESPIRATION RATE: 18 BRPM | HEART RATE: 100 BPM | BODY MASS INDEX: 38.98 KG/M2 | WEIGHT: 220 LBS | HEIGHT: 63 IN | OXYGEN SATURATION: 98 % | SYSTOLIC BLOOD PRESSURE: 160 MMHG

## 2022-06-17 DIAGNOSIS — E11.9 TYPE 2 DIABETES MELLITUS WITHOUT COMPLICATION, WITHOUT LONG-TERM CURRENT USE OF INSULIN (HCC): ICD-10-CM

## 2022-06-17 DIAGNOSIS — E66.01 SEVERE OBESITY (BMI 35.0-39.9) WITH COMORBIDITY (HCC): ICD-10-CM

## 2022-06-17 DIAGNOSIS — Z09 HOSPITAL DISCHARGE FOLLOW-UP: Primary | ICD-10-CM

## 2022-06-17 DIAGNOSIS — N18.31 STAGE 3A CHRONIC KIDNEY DISEASE (HCC): ICD-10-CM

## 2022-06-17 DIAGNOSIS — L02.91 ABSCESS: ICD-10-CM

## 2022-06-17 PROCEDURE — 1111F DSCHRG MED/CURRENT MED MERGE: CPT | Performed by: FAMILY MEDICINE

## 2022-06-17 PROCEDURE — 99496 TRANSJ CARE MGMT HIGH F2F 7D: CPT | Performed by: FAMILY MEDICINE

## 2022-06-17 SDOH — ECONOMIC STABILITY: FOOD INSECURITY: WITHIN THE PAST 12 MONTHS, YOU WORRIED THAT YOUR FOOD WOULD RUN OUT BEFORE YOU GOT MONEY TO BUY MORE.: NEVER TRUE

## 2022-06-17 SDOH — ECONOMIC STABILITY: FOOD INSECURITY: WITHIN THE PAST 12 MONTHS, THE FOOD YOU BOUGHT JUST DIDN'T LAST AND YOU DIDN'T HAVE MONEY TO GET MORE.: NEVER TRUE

## 2022-06-17 ASSESSMENT — ENCOUNTER SYMPTOMS
PHOTOPHOBIA: 0
EYE REDNESS: 0
ABDOMINAL DISTENTION: 0
SORE THROAT: 0
BACK PAIN: 1
COUGH: 0
ABDOMINAL PAIN: 1
SHORTNESS OF BREATH: 0
VOICE CHANGE: 0
WHEEZING: 0
TROUBLE SWALLOWING: 0

## 2022-06-17 ASSESSMENT — PATIENT HEALTH QUESTIONNAIRE - PHQ9
SUM OF ALL RESPONSES TO PHQ QUESTIONS 1-9: 0
SUM OF ALL RESPONSES TO PHQ9 QUESTIONS 1 & 2: 0
SUM OF ALL RESPONSES TO PHQ QUESTIONS 1-9: 0
SUM OF ALL RESPONSES TO PHQ QUESTIONS 1-9: 0
2. FEELING DOWN, DEPRESSED OR HOPELESS: 0
SUM OF ALL RESPONSES TO PHQ QUESTIONS 1-9: 0
1. LITTLE INTEREST OR PLEASURE IN DOING THINGS: 0

## 2022-06-17 ASSESSMENT — LIFESTYLE VARIABLES
HOW MANY STANDARD DRINKS CONTAINING ALCOHOL DO YOU HAVE ON A TYPICAL DAY: 1 OR 2
HOW OFTEN DO YOU HAVE A DRINK CONTAINING ALCOHOL: MONTHLY OR LESS

## 2022-06-17 ASSESSMENT — SOCIAL DETERMINANTS OF HEALTH (SDOH): HOW HARD IS IT FOR YOU TO PAY FOR THE VERY BASICS LIKE FOOD, HOUSING, MEDICAL CARE, AND HEATING?: NOT HARD AT ALL

## 2022-06-17 NOTE — PROGRESS NOTES
Disp: , Rfl:     Multiple Vitamin (MULTI-VITAMIN DAILY) TABS, Take 1 tablet by mouth daily, Disp: , Rfl:     aspirin 81 MG tablet, Take 81 mg by mouth daily, Disp: , Rfl:     Turmeric 500 MG TABS, Take 1 tablet by mouth daily , Disp: , Rfl:     ondansetron (ZOFRAN-ODT) 4 MG disintegrating tablet, Take 1 tablet by mouth every 8 hours as needed for Nausea or Vomiting (Patient not taking: Reported on 6/17/2022), Disp: 30 tablet, Rfl: 0    methylPREDNISolone (MEDROL DOSEPACK) 4 MG tablet, Take by mouth. (Patient not taking: Reported on 6/14/2022), Disp: 1 kit, Rfl: 0    tiZANidine (ZANAFLEX) 2 MG tablet, Take 1 tablet by mouth nightly as needed (back pain) (Patient not taking: Reported on 6/14/2022), Disp: 10 tablet, Rfl: 0  No Known Allergies    No past medical history on file. No past surgical history on file. No family history on file. Social History     Tobacco History     Smoking Status  Never Smoker    Smokeless Tobacco Use  Never Used          Alcohol History     Alcohol Use Status  Never          Drug Use     Drug Use Status  Never          Sexual Activity     Sexually Active  Not Currently Partners  Male Birth Control/Protection  Post-menopausal                OBJECTIVE  Vitals:    06/17/22 1057 06/17/22 1107   BP: (!) 150/76 (!) 160/80   Pulse: 100    Resp: 18    Temp: 97.8 °F (36.6 °C)    TempSrc: Temporal    SpO2: 98%    Weight: 220 lb (99.8 kg)    Height: 5' 3\" (1.6 m)         Body mass index is 38.97 kg/m². Orders Placed This Encounter   Procedures    [place] DISCHARGE MEDS RECONCILED W/ CURRENT OUTPATIENT MED LIST        EXAM   Physical Exam  Vitals and nursing note reviewed. Constitutional:       Appearance: Normal appearance. She is obese. She is ill-appearing. HENT:      Right Ear: Tympanic membrane normal.      Left Ear: Tympanic membrane normal.      Nose: No congestion. Mouth/Throat:      Pharynx: Oropharynx is clear. No posterior oropharyngeal erythema.    Eyes: Conjunctiva/sclera: Conjunctivae normal.   Cardiovascular:      Rate and Rhythm: Normal rate and regular rhythm. Heart sounds: No murmur heard. Pulmonary:      Effort: Pulmonary effort is normal.      Breath sounds: Normal breath sounds. Abdominal:      General: Bowel sounds are normal.      Tenderness: There is no guarding or rebound. Musculoskeletal:      Right lower leg: Edema present. Left lower leg: Edema present. Skin:     Coloration: Skin is not jaundiced. Findings: No bruising or rash. Neurological:      General: No focal deficit present. Mental Status: She is alert and oriented to person, place, and time. Motor: Weakness present. Gait: Gait abnormal.   Psychiatric:         Mood and Affect: Mood normal.         Behavior: Behavior normal.           Elfego was seen today for follow-up from hospital, extremity weakness and vaginal bleeding. Diagnoses and all orders for this visit:    Hospital discharge follow-up  -     [place] DISCHARGE MEDS RECONCILED W/ CURRENT OUTPATIENT MED LIST  Discharge summaries from [address] [place] [address] and [address] [place] [address] Road reviewed. C T scans reviewed  Abscess  Has pic line in. Receiving Charissa Wing, doing some better. Called and talked with Diamond Montez who reviewed CT scans. Feels u/s wouldn't help much. Gettng IVs at [address] under Dr. Evelina Srinivasan. Will see him and Elinor Min will do another CT on Wednesday. If she gets worse to ER for this and will have CT done there. Severe obesity (BMI 35.0-39. 9) with comorbidity ([address]  Not really discussed today. Has albumin 2.1. advised glucerna or boost to vinny this up some  Type 2 diabetes mellitus without complication, without long-term current use of insulin ([facility])  A1C 6/10 was 7.5 which is fine  Stage 3a chronic kidney disease ([address]    GFR yesterday 69 obviously not an issue      Return in 1 week (on 6/24/2022).     Electronically signed by Brittni Hodge MD on 6/17/22 at 12:02 PM EDT   Post-Discharge Transitional Care  Follow Beck Hamlin   YOB: 1940    Date of Office Visit:  6/17/2022  Date of Hospital Admission: 6/10/22  Date of Hospital Discharge: 6/13/22  Risk of hospital readmission (high >=14%. Medium >=10%) :Readmission Risk Score: 11.6 ( )      Care management risk score Rising risk (score 2-5) and Complex Care (Scores >=6): 2     Non face to face  following discharge, date last encounter closed (first attempt may have been earlier): 6/14/2022 12:00 PM    Call initiated 2 business days of discharge: Yes    ASSESSMENT/PLAN:   Hospital discharge follow-up  -     [place] DISCHARGE MEDS RECONCILED W/ CURRENT OUTPATIENT MED LIST  Abscess  Severe obesity (BMI 35.0-39. 9) with comorbidity ([address]  Type 2 diabetes mellitus without complication, without long-term current use of insulin (HCC)  Stage 3a chronic kidney disease [facility])      Medical Decision Making: high complexity  Return in 1 week (on 6/24/2022). On this date 6/17/2022 I have spent 20 minutes reviewing previous notes, test results and face to face with the patient discussing the diagnosis and importance of compliance with the treatment plan as well as documenting on the day of the visit. Subjective:   HPI:  Follow up of Hospital problems/diagnosis(es): see OV note    Inpatient course: Discharge summary reviewed- see chart. Interval history/Current status: receiving daily IV Ivanz at [facility] under Dr. Palma López.  Will need PT probably at [facility] when crisis resolves    Patient Active Problem List   Diagnosis    Type 2 diabetes mellitus without complication, without long-term current use of insulin ([address]    Other hyperlipidemia    Osteopenia of multiple sites    Immunization due    Morbidly obese ([address]    Chronic renal disease, stage III ([address] [459044]    Abscess    Psoas muscle abscess ([address]       Medications listed as ordered at the time of discharge from hospital     Medication List          Accurate as of June 17, 2022 11:41 PM. If you have any questions, ask your nurse or doctor. CONTINUE taking these medications    aspirin 81 MG tablet     Calcium + D3 600-200 MG-UNIT Tabs tablet     ertapenem  infusion  Commonly known as: INVanz  Infuse 1,000 mg intravenously every 24 hours for 21 days For at least 3 weeks     Re : Proteus bacteremia, soft tissue infection ( pelvic)     methylPREDNISolone 4 MG tablet  Commonly known as: MEDROL DOSEPACK  Take by mouth. Multi-Vitamin Daily Tabs     ondansetron 4 MG disintegrating tablet  Commonly known as: ZOFRAN-ODT  Take 1 tablet by mouth every 8 hours as needed for Nausea or Vomiting     pioglitazone 30 MG tablet  Commonly known as: ACTOS  TAKE 1 TABLET DAILY     tiZANidine 2 MG tablet  Commonly known as: ZANAFLEX  Take 1 tablet by mouth nightly as needed (back pain)     Turmeric 500 MG Tabs              Medications marked \"taking\" at this time  Outpatient Medications Marked as Taking for the 6/17/22 encounter (Office Visit) with Pauline Meckel, MD   Medication Sig Dispense Refill    ertapenem [facility]) infusion Infuse 1,000 mg intravenously every 24 hours for 21 days For at least 3 weeks     Re : Proteus bacteremia, soft tissue infection ( pelvic) 21 g 0    pioglitazone (ACTOS) 30 MG tablet TAKE 1 TABLET DAILY 90 tablet 3    Calcium Carb-Cholecalciferol (CALCIUM + D3) 600-200 MG-UNIT TABS tablet Take 1 tablet by mouth daily       Multiple Vitamin (MULTI-VITAMIN DAILY) TABS Take 1 tablet by mouth daily      aspirin 81 MG tablet Take 81 mg by mouth daily      Turmeric 500 MG TABS Take 1 tablet by mouth daily           Medications patient taking as of now reconciled against medications ordered at time of hospital discharge: Yes    A comprehensive review of systems was negative except for what was noted in the HPI.     Objective:    BP (!) 160/80   Pulse 100   Temp 97.8 °F (36.6 °C) (Temporal)   Resp 18   Ht 5' 3\" (1.6 m)   Wt 220 lb (99.8 kg)   SpO2 98%   BMI 38.97 kg/m²   See OV note      An electronic signature was used to authenticate this note.   --Verito Pierce MD

## 2022-06-17 NOTE — TELEPHONE ENCOUNTER
Called patient and left a message to call back the office. Per Dr. Mera Acosta:    Dr. Mera Acosta talked to Dr. Archana Cabrera and he believes that the [address] today would not be able to happen. He recommends for patient to see Dr. Tim Gutiérrez on Wednesday and have a CT done at [facility]. If problems worsen over the weekend go to the ER and probably will do CT then. Recommends monitoring the vaginal bleeding. Doctor Mera Acosta would like to see patient late next week.

## 2022-06-21 ENCOUNTER — CARE COORDINATION (OUTPATIENT)
Dept: CASE MANAGEMENT | Age: 82
End: 2022-06-21

## 2022-06-21 NOTE — CARE COORDINATION
[facility] Transitions Follow Up Call    2022    Patient: Clarissa Gonzalez  Patient : 1940   MRN: [id number]  Reason for Admission:   Discharge Date: 22 RARS: Readmission Risk Score: 11.6 ( )         Spoke with: Patient, Stacey Epperson. Patient is pleasant in conversation. -reports occasional fatigue  -patient reports stiffness in lower back is improving  -denies fever, chills, shortness of breath, or chest discomfort  -reports decreased BLE edema  -reports appetite fluctuates  -reports normal bladder/bowel elimination  -denies any further episodes of vaginal bleeding    Patient and spouse report they are going to [facility]; decline any further calls from this CTN. CTN will sign off and resolve episode.      Follow Up  Future Appointments   Date Time Provider Naif Lancaster   2022  9:00 AM Jeffry Olson MD [facility]   2022 11:00 AM Jahaira Diggs MD [facility]   2022  8:00 AM MD Sara Meza RN

## 2022-06-23 ENCOUNTER — TELEPHONE (OUTPATIENT)
Dept: FAMILY MEDICINE CLINIC | Age: 82
End: 2022-06-23

## 2022-06-23 NOTE — TELEPHONE ENCOUNTER
I am pretty sure I am seeing her later today. She must be doing considerably better than she was last time I saw her if this is her big concern!

## 2022-06-23 NOTE — TELEPHONE ENCOUNTER
----- Message from [facility] sent at 6/23/2022  8:32 AM EDT -----  Subject: Message to Provider    QUESTIONS  Information for Provider? Patient inquiring about compression socks which   she believes will need to be custom ordered. She was looking for   information on if this is the type of thing that can be taken care of by   her PCP or if she needs to look elsewhere for this particular issue. Please contact patient with any additional info.  ---------------------------------------------------------------------------  --------------  CALL BACK INFO  What is the best way for the office to contact you? OK to leave message on   voicemail  Preferred Call Back Phone Number? [phone]  ---------------------------------------------------------------------------  --------------  SCRIPT ANSWERS  Relationship to Patient?  Self

## 2022-07-06 ENCOUNTER — TELEPHONE (OUTPATIENT)
Dept: FAMILY MEDICINE CLINIC | Age: 82
End: 2022-07-06

## 2022-07-06 DIAGNOSIS — K68.12 PSOAS ABSCESS (HCC): Primary | ICD-10-CM

## 2022-07-06 NOTE — TELEPHONE ENCOUNTER
Pt's daughter is requesting an order for PT. She took the order that the hospital provided but [facility] is asking if the PCP will write the order instead of Infectious Disease (insurance issue).      Order will need faxed to [phone]  Dx Y48.73 Psoas Muscle Abscess    We kuldeep need to call Salud Wakefield back if you do not want to place the referral.

## 2022-07-06 NOTE — TELEPHONE ENCOUNTER
Spoke with daughter Saba Barth. She stated that nothing in particular is needed just the basics. Please advise.

## 2022-07-12 ENCOUNTER — OFFICE VISIT (OUTPATIENT)
Dept: FAMILY MEDICINE CLINIC | Age: 82
End: 2022-07-12
Payer: MEDICARE

## 2022-07-12 DIAGNOSIS — Z09 HOSPITAL DISCHARGE FOLLOW-UP: Primary | ICD-10-CM

## 2022-07-12 DIAGNOSIS — K68.12 PSOAS MUSCLE ABSCESS (HCC): ICD-10-CM

## 2022-07-12 DIAGNOSIS — E11.22 TYPE 2 DIABETES MELLITUS WITH STAGE 3B CHRONIC KIDNEY DISEASE, WITHOUT LONG-TERM CURRENT USE OF INSULIN (HCC): ICD-10-CM

## 2022-07-12 DIAGNOSIS — N18.32 TYPE 2 DIABETES MELLITUS WITH STAGE 3B CHRONIC KIDNEY DISEASE, WITHOUT LONG-TERM CURRENT USE OF INSULIN (HCC): ICD-10-CM

## 2022-07-12 PROCEDURE — 1111F DSCHRG MED/CURRENT MED MERGE: CPT | Performed by: FAMILY MEDICINE

## 2022-07-12 PROCEDURE — G8399 PT W/DXA RESULTS DOCUMENT: HCPCS | Performed by: FAMILY MEDICINE

## 2022-07-12 PROCEDURE — 1123F ACP DISCUSS/DSCN MKR DOCD: CPT | Performed by: FAMILY MEDICINE

## 2022-07-12 PROCEDURE — 1090F PRES/ABSN URINE INCON ASSESS: CPT | Performed by: FAMILY MEDICINE

## 2022-07-12 PROCEDURE — 3051F HG A1C>EQUAL 7.0%<8.0%: CPT | Performed by: FAMILY MEDICINE

## 2022-07-12 PROCEDURE — G8427 DOCREV CUR MEDS BY ELIG CLIN: HCPCS | Performed by: FAMILY MEDICINE

## 2022-07-12 PROCEDURE — 1036F TOBACCO NON-USER: CPT | Performed by: FAMILY MEDICINE

## 2022-07-12 PROCEDURE — G8417 CALC BMI ABV UP PARAM F/U: HCPCS | Performed by: FAMILY MEDICINE

## 2022-07-12 PROCEDURE — 99214 OFFICE O/P EST MOD 30 MIN: CPT | Performed by: FAMILY MEDICINE

## 2022-07-12 RX ORDER — DOCUSATE SODIUM 100 MG/1
100 CAPSULE, LIQUID FILLED ORAL DAILY PRN
Qty: 30 CAPSULE | Refills: 5 | Status: SHIPPED | OUTPATIENT
Start: 2022-07-12

## 2022-07-12 RX ORDER — TRAMADOL HYDROCHLORIDE 50 MG/1
50 TABLET ORAL 2 TIMES DAILY PRN
Qty: 60 TABLET | Refills: 1 | Status: SHIPPED | OUTPATIENT
Start: 2022-07-12 | End: 2022-08-11

## 2022-07-12 RX ORDER — FERROUS SULFATE 325(65) MG
325 TABLET ORAL
Qty: 30 TABLET | Refills: 5 | Status: SHIPPED | OUTPATIENT
Start: 2022-07-12

## 2022-07-12 NOTE — PROGRESS NOTES
OFFICE NOTE    7/12/22  Name: Bhavin Camacho  WSJ:[id number]   Sex:female   Age:82 y.o. SUBJECTIVE  Chief Complaint   Patient presents with    Follow-Up from Hospital     [facility] pain, infection        HPI comes in for f/u. Will be finishing antibiotic and going over to meet with outpatient ID and het her pic line out. Never had to have surgical drainage of psoas abscess    Review of Systems   Constitutional: Positive for activity change and fatigue. Negative for appetite change, fever and unexpected weight change. HENT: Positive for hearing loss and sinus pressure. Negative for congestion, ear pain and postnasal drip. Eyes: Negative. Negative for photophobia, redness and visual disturbance. Respiratory: Negative for cough, chest tightness, shortness of breath and wheezing. Cardiovascular: Positive for leg swelling. Negative for chest pain and palpitations. Gastrointestinal: Negative for abdominal pain, blood in stool, constipation, diarrhea and vomiting. Endocrine: Negative for cold intolerance, polydipsia and polyuria. Genitourinary: Positive for urgency. Negative for dysuria and hematuria. Musculoskeletal: Positive for arthralgias. Negative for gait problem and joint swelling. Skin: Negative for rash and wound. Allergic/Immunologic: Negative for environmental allergies and food allergies. Neurological: Positive for seizures. Negative for dizziness, tremors, speech difficulty, weakness and headaches. SLRs slightly positive on right at 70 degrees   Hematological: Negative for adenopathy. Does not bruise/bleed easily. Psychiatric/Behavioral: Positive for confusion. Negative for agitation, behavioral problems, dysphoric mood and sleep disturbance. The patient is nervous/anxious. Seemed to be repeating herself today            Current Outpatient Medications:     traMADol (ULTRAM) 50 MG tablet, Take 1 tablet by mouth 2 times daily as needed for Pain for up to 30 days.  [place] DISCHARGE MEDS RECONCILED W/ CURRENT OUTPATIENT MED LIST        EXAM   Physical Exam  Vitals and nursing note reviewed. Constitutional:       Appearance: Normal appearance. She is well-developed. She is obese. HENT:      Right Ear: Tympanic membrane, ear canal and external ear normal.      Left Ear: Tympanic membrane, ear canal and external ear normal.      Nose: Nose normal.      Mouth/Throat:      Pharynx: Oropharynx is clear. No posterior oropharyngeal erythema. Eyes:      General: No scleral icterus. Conjunctiva/sclera: Conjunctivae normal.      Pupils: Pupils are equal, round, and reactive to light. Neck:      Thyroid: No thyroid mass or thyromegaly. Vascular: No JVD. Trachea: Trachea normal.   Cardiovascular:      Rate and Rhythm: Normal rate and regular rhythm. Heart sounds: Normal heart sounds. No murmur heard. No gallop. Pulmonary:      Effort: Pulmonary effort is normal.      Breath sounds: Normal breath sounds. No wheezing or rales. Abdominal:      General: Bowel sounds are normal. There is no distension. Palpations: Abdomen is soft. There is no mass. Tenderness: There is no abdominal tenderness. There is no guarding. Musculoskeletal:         General: Normal range of motion. Cervical back: Neck supple. Lymphadenopathy:      Cervical: No cervical adenopathy. Skin:     General: Skin is warm and dry. Findings: No rash. Neurological:      General: No focal deficit present. Mental Status: She is alert and oriented to person, place, and time. Motor: No abnormal muscle tone.       Comments: Some coginitive impairment suspected     Psychiatric:         Mood and Affect: Mood normal.         Behavior: Behavior normal.           Elfego was seen today for follow-up from hospital.    Diagnoses and all orders for this visit:    Hospital discharge follow-up  -     [place] DISCHARGE MEDS RECONCILED W/ CURRENT OUTPATIENT MED LIST  -     traMADol (ULTRAM) 50 MG tablet; Take 1 tablet by mouth 2 times daily as needed for Pain for up to 30 days. Intended supply: 5 days. Take lowest dose possible to manage pain  Has f/u with Dr. Rosa Abraham in about a week. Pic line may be removed  Type 2 diabetes mellitus with stage 3b chronic kidney disease, without long-term current use of insulin ([address]  Most recent renal function looks normal to me. May have been transitory HIGINIO  Psoas muscle abscess ([address]  Surgical drainage was not required. Looks like may be cured  Other orders  -     ferrous sulfate (IRON 325) 325 (65 Fe) MG tablet; Take 1 tablet by mouth daily (with breakfast)  -     docusate sodium (COLACE) 100 MG capsule; Take 1 capsule by mouth daily as needed for Constipation  Was still pretty anemic at discharge genaro treat and see back in 3 mos        No follow-ups on file.     Electronically signed by Benjamin Senior MD on 7/12/22 at 2:58 PM EDT

## 2022-07-13 VITALS
RESPIRATION RATE: 18 BRPM | DIASTOLIC BLOOD PRESSURE: 74 MMHG | OXYGEN SATURATION: 96 % | WEIGHT: 220 LBS | TEMPERATURE: 96.9 F | BODY MASS INDEX: 38.98 KG/M2 | SYSTOLIC BLOOD PRESSURE: 135 MMHG | HEIGHT: 63 IN | HEART RATE: 98 BPM

## 2022-07-13 PROBLEM — L02.91 ABSCESS: Status: RESOLVED | Noted: 2022-06-10 | Resolved: 2022-07-13

## 2022-07-13 PROBLEM — E11.22 TYPE 2 DIABETES MELLITUS WITH CHRONIC KIDNEY DISEASE (HCC): Status: ACTIVE | Noted: 2022-07-13

## 2022-07-13 ASSESSMENT — ENCOUNTER SYMPTOMS
WHEEZING: 0
DIARRHEA: 0
PHOTOPHOBIA: 0
SINUS PRESSURE: 1
EYES NEGATIVE: 1
CONSTIPATION: 0
ABDOMINAL PAIN: 0
SHORTNESS OF BREATH: 0
VOMITING: 0
BLOOD IN STOOL: 0
EYE REDNESS: 0
COUGH: 0
CHEST TIGHTNESS: 0

## 2022-07-27 ENCOUNTER — TELEPHONE (OUTPATIENT)
Dept: FAMILY MEDICINE CLINIC | Age: 82
End: 2022-07-27

## 2022-07-27 DIAGNOSIS — M54.32 BILATERAL SCIATICA: Primary | ICD-10-CM

## 2022-07-27 DIAGNOSIS — M54.31 BILATERAL SCIATICA: Primary | ICD-10-CM

## 2022-07-27 DIAGNOSIS — K68.12 PSOAS ABSCESS (HCC): ICD-10-CM

## 2022-07-27 NOTE — TELEPHONE ENCOUNTER
Daughter Albert Alcala calling in she is still struggling with back&leg pain. She has been doing physical therapy for it with a dx of sciatic pain.  She would like to move fwd with a MRI or back Dr referral, PT recommends possible Mri too, please advise

## 2022-07-28 NOTE — TELEPHONE ENCOUNTER
Spoke with Leigh Roamno and she stated that she spoke with her physical therapist and he suggested going to [facility] and their is a doctor that gives shots. Patient was wondering if she can go their for that? Please advise.

## 2022-07-29 NOTE — TELEPHONE ENCOUNTER
[facility] - Daughter  [phone]     She is calling back regarding the MRI. She said that on one of the recent office visits you said that if she continues in pain, you would consider getting an MRI. So she is thinking this is what she needs.

## 2022-08-01 NOTE — TELEPHONE ENCOUNTER
We had 2 messages close together somewhat contradictory. Referred to Pain management at  for their opinion.  MRI can be easily obtained at  if Dr. Eze Luu feels it is the best test given the unusual circumstances

## 2022-08-03 NOTE — TELEPHONE ENCOUNTER
Henrique Riggs and [facility] informed. Henrique Riggs was irritated with the response, states [facility] has already gone to another doctor and the MRI was ordered, and to tell Dr Adela Gifford to just not worry about it then hung up the phone.

## 2022-08-05 NOTE — TELEPHONE ENCOUNTER
Her pain is almost certainly related to her psoas abscess and not normal disc disease. This has been followed by CT scans thus far. I am not sure an MRI would be the appropriate test. If it wasn't she would be even more irritated at me for costing her mother a lot of money for something she either didn't need or might not answer the question. This is an unusual case, I don't have all the answers and I am sure Barbie's daughter means well but doesn't have all the answers either.

## 2022-09-27 ENCOUNTER — TELEPHONE (OUTPATIENT)
Dept: FAMILY MEDICINE CLINIC | Age: 82
End: 2022-09-27

## 2022-09-27 ENCOUNTER — OFFICE VISIT (OUTPATIENT)
Dept: FAMILY MEDICINE CLINIC | Age: 82
End: 2022-09-27
Payer: MEDICARE

## 2022-09-27 VITALS
OXYGEN SATURATION: 98 % | WEIGHT: 218 LBS | HEIGHT: 63 IN | DIASTOLIC BLOOD PRESSURE: 82 MMHG | HEART RATE: 100 BPM | TEMPERATURE: 97.3 F | RESPIRATION RATE: 18 BRPM | BODY MASS INDEX: 38.62 KG/M2 | SYSTOLIC BLOOD PRESSURE: 138 MMHG

## 2022-09-27 DIAGNOSIS — E78.49 OTHER HYPERLIPIDEMIA: ICD-10-CM

## 2022-09-27 DIAGNOSIS — E11.9 CONTROLLED TYPE 2 DIABETES MELLITUS WITHOUT COMPLICATION, WITHOUT LONG-TERM CURRENT USE OF INSULIN (HCC): ICD-10-CM

## 2022-09-27 DIAGNOSIS — Z12.31 ENCOUNTER FOR SCREENING MAMMOGRAM FOR BREAST CANCER: Primary | ICD-10-CM

## 2022-09-27 DIAGNOSIS — M54.50 LOW BACK PAIN, UNSPECIFIED BACK PAIN LATERALITY, UNSPECIFIED CHRONICITY, UNSPECIFIED WHETHER SCIATICA PRESENT: Primary | ICD-10-CM

## 2022-09-27 DIAGNOSIS — R74.8 LIVER ENZYME ELEVATION: Primary | ICD-10-CM

## 2022-09-27 DIAGNOSIS — Z23 IMMUNIZATION DUE: ICD-10-CM

## 2022-09-27 LAB
ALBUMIN SERPL-MCNC: 3.5 G/DL (ref 3.5–5.2)
ALP BLD-CCNC: 203 U/L (ref 35–104)
ALT SERPL-CCNC: 11 U/L (ref 0–32)
ANION GAP SERPL CALCULATED.3IONS-SCNC: 12 MMOL/L (ref 7–16)
AST SERPL-CCNC: 20 U/L (ref 0–31)
BASOPHILS ABSOLUTE: 0.04 E9/L (ref 0–0.2)
BASOPHILS RELATIVE PERCENT: 0.7 % (ref 0–2)
BILIRUB SERPL-MCNC: 0.2 MG/DL (ref 0–1.2)
BUN BLDV-MCNC: 14 MG/DL (ref 6–23)
CALCIUM SERPL-MCNC: 9.6 MG/DL (ref 8.6–10.2)
CHLORIDE BLD-SCNC: 104 MMOL/L (ref 98–107)
CHOLESTEROL, TOTAL: 210 MG/DL (ref 0–199)
CO2: 25 MMOL/L (ref 22–29)
CREAT SERPL-MCNC: 0.8 MG/DL (ref 0.5–1)
CREATININE URINE: 101 MG/DL (ref 29–226)
EOSINOPHILS ABSOLUTE: 0.06 E9/L (ref 0.05–0.5)
EOSINOPHILS RELATIVE PERCENT: 1.1 % (ref 0–6)
GFR AFRICAN AMERICAN: >60
GFR NON-AFRICAN AMERICAN: >60 ML/MIN/1.73
GLUCOSE BLD-MCNC: 135 MG/DL (ref 74–99)
HBA1C MFR BLD: 6.6 % (ref 4–5.6)
HCT VFR BLD CALC: 35.4 % (ref 34–48)
HDLC SERPL-MCNC: 64 MG/DL
HEMOGLOBIN: 10.8 G/DL (ref 11.5–15.5)
IMMATURE GRANULOCYTES #: 0.02 E9/L
IMMATURE GRANULOCYTES %: 0.4 % (ref 0–5)
LDL CHOLESTEROL CALCULATED: 123 MG/DL (ref 0–99)
LYMPHOCYTES ABSOLUTE: 1.43 E9/L (ref 1.5–4)
LYMPHOCYTES RELATIVE PERCENT: 26.2 % (ref 20–42)
MCH RBC QN AUTO: 28.8 PG (ref 26–35)
MCHC RBC AUTO-ENTMCNC: 30.5 % (ref 32–34.5)
MCV RBC AUTO: 94.4 FL (ref 80–99.9)
MICROALBUMIN UR-MCNC: 21.1 MG/L
MICROALBUMIN/CREAT UR-RTO: 20.9 (ref 0–30)
MONOCYTES ABSOLUTE: 0.39 E9/L (ref 0.1–0.95)
MONOCYTES RELATIVE PERCENT: 7.2 % (ref 2–12)
NEUTROPHILS ABSOLUTE: 3.51 E9/L (ref 1.8–7.3)
NEUTROPHILS RELATIVE PERCENT: 64.4 % (ref 43–80)
PDW BLD-RTO: 15.1 FL (ref 11.5–15)
PLATELET # BLD: 401 E9/L (ref 130–450)
PMV BLD AUTO: 9.6 FL (ref 7–12)
POTASSIUM SERPL-SCNC: 4.2 MMOL/L (ref 3.5–5)
RBC # BLD: 3.75 E12/L (ref 3.5–5.5)
SODIUM BLD-SCNC: 141 MMOL/L (ref 132–146)
TOTAL PROTEIN: 7.8 G/DL (ref 6.4–8.3)
TRIGL SERPL-MCNC: 113 MG/DL (ref 0–149)
TSH SERPL DL<=0.05 MIU/L-ACNC: 1.57 UIU/ML (ref 0.27–4.2)
VLDLC SERPL CALC-MCNC: 23 MG/DL
WBC # BLD: 5.5 E9/L (ref 4.5–11.5)

## 2022-09-27 PROCEDURE — G8427 DOCREV CUR MEDS BY ELIG CLIN: HCPCS | Performed by: FAMILY MEDICINE

## 2022-09-27 PROCEDURE — 90694 VACC AIIV4 NO PRSRV 0.5ML IM: CPT | Performed by: FAMILY MEDICINE

## 2022-09-27 PROCEDURE — 1123F ACP DISCUSS/DSCN MKR DOCD: CPT | Performed by: FAMILY MEDICINE

## 2022-09-27 PROCEDURE — G8399 PT W/DXA RESULTS DOCUMENT: HCPCS | Performed by: FAMILY MEDICINE

## 2022-09-27 PROCEDURE — 1036F TOBACCO NON-USER: CPT | Performed by: FAMILY MEDICINE

## 2022-09-27 PROCEDURE — 99214 OFFICE O/P EST MOD 30 MIN: CPT | Performed by: FAMILY MEDICINE

## 2022-09-27 PROCEDURE — 1090F PRES/ABSN URINE INCON ASSESS: CPT | Performed by: FAMILY MEDICINE

## 2022-09-27 PROCEDURE — 3044F HG A1C LEVEL LT 7.0%: CPT | Performed by: FAMILY MEDICINE

## 2022-09-27 PROCEDURE — G0008 ADMIN INFLUENZA VIRUS VAC: HCPCS | Performed by: FAMILY MEDICINE

## 2022-09-27 PROCEDURE — G8417 CALC BMI ABV UP PARAM F/U: HCPCS | Performed by: FAMILY MEDICINE

## 2022-09-27 ASSESSMENT — ENCOUNTER SYMPTOMS
PHOTOPHOBIA: 0
WHEEZING: 0
DIARRHEA: 0
BLOOD IN STOOL: 0
SHORTNESS OF BREATH: 0
CONSTIPATION: 0
SINUS PRESSURE: 0
EYES NEGATIVE: 1
ABDOMINAL PAIN: 0
COUGH: 0
VOMITING: 0
EYE REDNESS: 0
CHEST TIGHTNESS: 0

## 2022-09-27 NOTE — TELEPHONE ENCOUNTER
Pt was in this morning and forgot to ask if she could continue with PT new orders would need sent to [facility] in [facility].

## 2022-09-27 NOTE — TELEPHONE ENCOUNTER
[facility] are [facility] and I will sign it.

## 2022-09-27 NOTE — PROGRESS NOTES
OFFICE NOTE    9/27/22  Name: Otilia Toure  UEU:[birth year]   Sex:female   Age:82 y.o. SUBJECTIVE  Chief Complaint   Patient presents with    Annual Exam       HPI Comes in for checkup and refills. Planning to head south to [place], most likely in January. Feeling much better with no indication of residual psoas infection or abscess    Review of Systems   Constitutional:  Positive for activity change and fatigue. Negative for appetite change, fever and unexpected weight change. HENT:  Positive for hearing loss. Negative for congestion, dental problem, ear pain, postnasal drip, sinus pressure and tinnitus. Eyes: Negative. Negative for photophobia, redness and visual disturbance. Respiratory:  Negative for cough, chest tightness, shortness of breath and wheezing. Cardiovascular:  Positive for leg swelling. Negative for chest pain and palpitations. Gastrointestinal:  Negative for abdominal pain, blood in stool, constipation, diarrhea and vomiting. Endocrine: Negative for cold intolerance, polydipsia and polyuria. Genitourinary:  Positive for frequency and urgency. Negative for dysuria, hematuria and vaginal bleeding. Musculoskeletal:  Negative for arthralgias, gait problem and joint swelling. Skin:  Negative for rash and wound. Allergic/Immunologic: Negative for environmental allergies and food allergies. Neurological:  Negative for dizziness, tremors, seizures, weakness, light-headedness, numbness and headaches. Hematological:  Negative for adenopathy. Does not bruise/bleed easily. Psychiatric/Behavioral:  Negative for behavioral problems, confusion, dysphoric mood and sleep disturbance.            Current Outpatient Medications:     ferrous sulfate (IRON 325) 325 (65 Fe) MG tablet, Take 1 tablet by mouth daily (with breakfast), Disp: 30 tablet, Rfl: 5    pioglitazone (ACTOS) 30 MG tablet, TAKE 1 TABLET DAILY, Disp: 90 tablet, Rfl: 3    Calcium Carb-Cholecalciferol (CALCIUM + D3) 600-200 MG-UNIT TABS tablet, Take 1 tablet by mouth daily , Disp: , Rfl:     Multiple Vitamin (MULTI-VITAMIN DAILY) TABS, Take 1 tablet by mouth daily, Disp: , Rfl:     aspirin 81 MG tablet, Take 81 mg by mouth daily, Disp: , Rfl:     docusate sodium (COLACE) 100 MG capsule, Take 1 capsule by mouth daily as needed for Constipation (Patient not taking: Reported on 9/27/2022), Disp: 30 capsule, Rfl: 5    tiZANidine (ZANAFLEX) 2 MG tablet, Take 1 tablet by mouth nightly as needed (back pain) (Patient not taking: No sig reported), Disp: 10 tablet, Rfl: 0    Turmeric 500 MG TABS, Take 1 tablet by mouth daily  (Patient not taking: Reported on 9/27/2022), Disp: , Rfl:   No Known Allergies    No past medical history on file. Past Surgical History:   Procedure Laterality Date    CT RETROPERITONEAL PERC DRAIN  6/30/2022    CT RETROPERITONEAL PERC DRAIN     No family history on file. Social History       Tobacco History       Smoking Status  Never      Smokeless Tobacco Use  Never              Alcohol History       Alcohol Use Status  Never              Drug Use       Drug Use Status  Never              Sexual Activity       Sexually Active  Not Currently Partners  Male Birth Control/Protection  Post-menopausal                    OBJECTIVE  Vitals:    09/27/22 0812   BP: 138/82   Pulse: 100   Resp: 18   Temp: 97.3 °F (36.3 °C)   TempSrc: Temporal   SpO2: 98%   Weight: 218 lb (98.9 kg)   Height: 5' 3\" (1.6 m)        Body mass index is 38.62 kg/m².     Orders Placed This Encounter   Procedures    [facility] GARETH DIGITAL SCREEN BILATERAL     Further imaging can be completed per [address] protocol     Standing Status:   Future     Standing Expiration Date:   11/27/2023    Influenza, FLUAD, (age 72 y+), IM, Preservative Free, 0.5 mL    CBC with Auto Differential     Standing Status:   Future     Number of Occurrences:   1     Standing Expiration Date:   9/27/2023    Comprehensive Metabolic Panel     Standing Status:   Future Number of Occurrences:   1     Standing Expiration Date:   9/27/2023    Lipid Panel     Standing Status:   Future     Number of Occurrences:   1     Standing Expiration Date:   9/27/2023    TSH     Standing Status:   Future     Number of Occurrences:   1     Standing Expiration Date:   9/27/2023    Hemoglobin A1C     Standing Status:   Future     Number of Occurrences:   1     Standing Expiration Date:   9/27/2023    Microalbumin / Creatinine Urine Ratio     Standing Status:   Future     Number of Occurrences:   1     Standing Expiration Date:   9/27/2023        EXAM   Physical Exam  Vitals and nursing note reviewed. Constitutional:       Appearance: Normal appearance. She is well-developed. She is obese. HENT:      Right Ear: Tympanic membrane, ear canal and external ear normal.      Left Ear: Tympanic membrane, ear canal and external ear normal.      Nose: Congestion and rhinorrhea present. Mouth/Throat:      Pharynx: Oropharynx is clear. No posterior oropharyngeal erythema. Eyes:      Conjunctiva/sclera: Conjunctivae normal.      Pupils: Pupils are equal, round, and reactive to light. Comments: May have slight icterus   Neck:      Thyroid: No thyroid mass or thyromegaly. Vascular: No carotid bruit or JVD. Trachea: Trachea normal.   Cardiovascular:      Rate and Rhythm: Regular rhythm. Tachycardia present. Heart sounds: Normal heart sounds. No murmur heard. No gallop. Pulmonary:      Effort: Pulmonary effort is normal.      Breath sounds: Normal breath sounds. No wheezing, rhonchi or rales. Chest:      Chest wall: No tenderness. Abdominal:      General: Bowel sounds are normal. There is no distension. Palpations: Abdomen is soft. There is no mass. Tenderness: There is no abdominal tenderness. There is no guarding. Hernia: No hernia is present. Musculoskeletal:         General: No swelling or tenderness. Normal range of motion.       Cervical back: Neck supple. No tenderness. Right lower leg: Edema present. Left lower leg: Edema present. Comments: Some post traumatic arthritis left ankle   Lymphadenopathy:      Cervical: No cervical adenopathy. Skin:     General: Skin is warm and dry. Capillary Refill: Capillary refill takes less than 2 seconds. Coloration: Skin is pale. Skin is not jaundiced. Findings: No bruising or rash. Comments: May be mild icterus   Neurological:      General: No focal deficit present. Mental Status: She is alert and oriented to person, place, and time. Sensory: Sensory deficit present. Motor: No weakness or abnormal muscle tone. Coordination: Coordination normal.      Gait: Gait normal.      Comments: Mostly left side knee down   Psychiatric:         Mood and Affect: Mood normal.         Behavior: Behavior normal.         Elfego was seen today for annual exam.    Diagnoses and all orders for this visit:    Encounter for screening mammogram for breast cancer  -     [facility] GARETH DIGITAL SCREEN BILATERAL; Future    Controlled type 2 diabetes mellitus without complication, without long-term current use of insulin (HCC)  -     CBC with Auto Differential; Future  -     Comprehensive Metabolic Panel; Future  -     Hemoglobin A1C; Future  -     Microalbumin / Creatinine Urine Ratio; Future  BS have been better. Some edema does not seem to mind this. May need to consider stopping pioglitazone and going on something like Trulicity, but prefers not to yet  Other hyperlipidemia  -     Lipid Panel; Future  -     TSH; Future    Immunization due  -     Influenza, FLUAD, (age 72 y+), IM, Preservative Free, 0.5 mL        No follow-ups on file.     Electronically signed by Teresa Gross MD on 9/27/22 at 8:26 AM EDT

## 2022-09-28 NOTE — TELEPHONE ENCOUNTER
Spoke w/ David Zimmer at [facility]. He would need an order for PT eval and treat, 2 times a week for 6 weeks. He is not able to bill w/ psoas muscle abscess again. But can submit to medicare for hip pain, back pain, leg pain.      New order sent

## 2022-12-02 NOTE — TELEPHONE ENCOUNTER
[facility] PROGRESS NOTE    Patient: Nini Gonzalez               Sex: female            MRN: [id number]    YOB: 1927      Age:  89 year old              Subjective:    Patient seen and examined.  Currently on 4 L of oxygen afebrile overnight.  No current nausea or vomiting.  No chills.  She states she does not feel as well today but overall is a much improved compared to initial presentation.  Patient's daughter at bedside all questions answered as well.    Objective:     Vitals:    12/01/22 1715 12/01/22 2052 12/02/22 0526 12/02/22 1015   BP: 124/66 105/54 107/58 103/63   BP Location: LUE - Left upper extremity RUE - Right upper extremity  LUE - Left upper extremity   Patient Position: Supine Supine  Supine   Pulse: 70 85 (!) 59 71   Resp: 18 18 16 20   Temp: 97.5 °F (36.4 °C) 97.4 °F (36.3 °C) 97.9 °F (36.6 °C) 97.4 °F (36.3 °C)   TempSrc: Oral Oral Oral Axillary   SpO2: 93% 93% 93% 91%   Weight:       Height:             General: Alert and oriented x3  Skin: Within normal limits  HEENT: Pupils equal and reactive extraocular movements are intact  Neck: Supple  Respiratory: Bilaterally decreased breath sounds with prolonged expiration  Cardiac: S1-S2 regular rate and rhythm  Abdomen: Soft nontender normal bowel sounds  Extremities: Bilateral lower extremity edema  CNS: Awake oriented x3.  No obvious focal deficits noted on exam      Current medications:  Current Facility-Administered Medications   Medication Dose Route Frequency Provider Last Rate Last Admin   • dilTIAZem (TIAZAC,CARDIZEM CD) 24 hr capsule 180 mg  180 mg Oral Daily Juan Pablo Zepeda MD   180 mg at 12/02/22 1015   • benzonatate (TESSALON PERLES) capsule 100 mg  100 mg Oral TID Mechelle Mendieta MD   100 mg at 12/02/22 1349   • apixaBAN (ELIQUIS) tablet 2.5 mg  2.5 mg Oral BID Juan Pablo Zepeda MD   2.5 mg at 12/02/22 1015   • metoPROLOL succinate (TOPROL-XL) ER tablet 50 mg  50 mg Oral BID  Last Appointment:  9/21/2020  Future Appointments   Date Time Provider Naif Lancaster   9/20/2021  8:00 AM Robina Wilson  [address] Juan Pablo Zepeda MD   50 mg at 12/02/22 1015   • fluticasone-vilanterol (BREO ELLIPTA) 100-25 MCG/ACT inhaler 1 puff  1 puff Inhalation Daily Resp Mechelle Mendieta MD   1 puff at 12/02/22 0919   • levothyroxine (SYNTHROID, LEVOTHROID) tablet 112 mcg  112 mcg Oral QAM AC Mechelle Mendieta MD   112 mcg at 12/02/22 0559       Lab Data:    Recent Results (from the past 24 hour(s))   Partial Thromboplastin Time    Collection Time: 12/02/22  4:37 AM   Result Value Ref Range    PTT 26 22 - 30 sec   Comprehensive Metabolic Panel    Collection Time: 12/02/22  4:37 AM   Result Value Ref Range    Fasting Status      Sodium 143 135 - 145 mmol/L    Potassium 5.1 3.4 - 5.1 mmol/L    Chloride 105 97 - 110 mmol/L    Carbon Dioxide 30 21 - 32 mmol/L    Anion Gap 13 7 - 19 mmol/L    Glucose 119 (H) 70 - 99 mg/dL    BUN 61 (H) 6 - 20 mg/dL    Creatinine 2.61 (H) 0.51 - 0.95 mg/dL    Glomerular Filtration Rate 17 (L) >=60    BUN/ Creatinine Ratio 23 7 - 25    Calcium 9.3 8.4 - 10.2 mg/dL    Bilirubin, Total 0.5 0.2 - 1.0 mg/dL    GOT/AST 10 <=37 Units/L    GPT/ALT 10 <64 Units/L    Alkaline Phosphatase 61 45 - 117 Units/L    Albumin 2.6 (L) 3.6 - 5.1 g/dL    Protein, Total 6.3 (L) 6.4 - 8.2 g/dL    Globulin 3.7 2.0 - 4.0 g/dL    A/G Ratio 0.7 (L) 1.0 - 2.4   CBC No Differential    Collection Time: 12/02/22  4:37 AM   Result Value Ref Range    WBC 6.0 4.2 - 11.0 K/mcL    RBC 3.93 (L) 4.00 - 5.20 mil/mcL    HGB 12.5 12.0 - 15.5 g/dL    HCT 40.5 36.0 - 46.5 %    .1 (H) 78.0 - 100.0 fl    MCH 31.8 26.0 - 34.0 pg    MCHC 30.9 (L) 32.0 - 36.5 g/dL     140 - 450 K/mcL    RDW-CV 13.6 11.0 - 15.0 %    RDW-SD 52.5 (H) 39.0 - 50.0 fL    NRBC 0 <=0 /100 WBC         Microbiology Results     None            Assessment   1.  Acute hypoxic respiratory failure  2.  Possible pneumonia  3.  Atrial fibrillation with rapid ventricular response  4.  Acute CHF exacerbation with diastolic dysfunction with possible underlying lymphedema.  5.   Essential hypertension  6.  ERIBERTO on CKD stage IIIb, baseline creatinine 1.2-1.3.  7.  Hypothyroidism  8.  Lower extremity edema likely related to #4 possible underlying lymphedema as well.    Plan:  Continue to wean down O2 as able to tolerate.  Patient initially received a course of antibiotics for being monitored off antibiotics for now..  Patient is undergoing a trial of diuretics as recommended by nephrology.  Continue to avoid nephrotoxic agents and labs will be repeated again in AM.  In terms of atrial fibrillation appreciate cardiology recommendations.  Patient's been transitioned to oral metoprolol and Cardizem.  On Eliquis for anticoagulation although renal function is being monitored closely.  Continue other chronic home medications as currently noted on the active medication list.  DVT prophylaxis: On Eliquis PT/OT following.

## 2023-03-29 DIAGNOSIS — E11.9 TYPE 2 DIABETES MELLITUS WITHOUT COMPLICATION, WITHOUT LONG-TERM CURRENT USE OF INSULIN (HCC): ICD-10-CM

## 2023-03-29 RX ORDER — PIOGLITAZONEHYDROCHLORIDE 30 MG/1
30 TABLET ORAL DAILY
Qty: 90 TABLET | Refills: 3 | Status: SHIPPED | OUTPATIENT
Start: 2023-03-29

## 2023-03-29 NOTE — TELEPHONE ENCOUNTER
Last Appointment:  9/27/2022  Future Appointments   Date Time Provider Naif Lancaster   9/27/2023  8:00 AM James Haque  [address] [place] [address]

## 2023-08-18 ENCOUNTER — TELEPHONE (OUTPATIENT)
Dept: PHARMACY | Facility: CLINIC | Age: 83
End: 2023-08-18

## 2023-08-19 NOTE — TELEPHONE ENCOUNTER
[facility] HEALTH CLINICAL PHARMACY REVIEW: ADHERENCE  Identified care gap per [employer]: fills at [facility]: Diabetes adherence    ASSESSMENT  DIABETES ADHERENCE    Insurance Records claims through 08/14/2023 (Prior Year [address] = not reported; YTD [address] = FIRST FILL; Potential Fail Date: 08/31/2023):   pioglitazone 30mg daily last filled on 03/29/2023 for 100 day supply. Next refill due: 07/07/2023    Last Rx sent on 03/29/2023 for 90ds with 3 refills (360 tabs) - 260 tabs remaining    Lab Results   Component Value Date    LABA1C 6.6 (H) 09/27/2022    LABA1C 7.5 (H) 06/10/2022    LABA1C 7.6 (H) 06/09/2022     NOTE A1c not yet completed this calendar year     PLAN  The following are interventions that have been identified:   Patient overdue refilling pioglitazone and active on home medication list.   Patient identified as having no A1c level in the calendar year    Per [employer] MyScript via [employer] portal, copay should be $0 for 100ds. Patient has appointment next month and anticipate A1c being drawn or ordered at that time. Will prepare letter to be mailed to patient.      Last Visit: 09/27/2023  Next Visit: 09/25/2023    Mian Whitehead, PharmD, [address] [place] [address], [address] [place] [address], toll free: [phone], option 1    =======================================================    For Pharmacy Admin Tracking Only  Program: Enrrique in place:  No  Gap Closed?: No   Time Spent (min): 15

## 2023-09-25 ENCOUNTER — OFFICE VISIT (OUTPATIENT)
Dept: FAMILY MEDICINE CLINIC | Age: 83
End: 2023-09-25
Payer: MEDICARE

## 2023-09-25 VITALS
WEIGHT: 214.8 LBS | RESPIRATION RATE: 18 BRPM | TEMPERATURE: 97 F | HEART RATE: 92 BPM | SYSTOLIC BLOOD PRESSURE: 138 MMHG | BODY MASS INDEX: 38.06 KG/M2 | DIASTOLIC BLOOD PRESSURE: 82 MMHG | OXYGEN SATURATION: 100 % | HEIGHT: 63 IN

## 2023-09-25 DIAGNOSIS — E78.49 OTHER HYPERLIPIDEMIA: ICD-10-CM

## 2023-09-25 DIAGNOSIS — N18.32 TYPE 2 DIABETES MELLITUS WITH STAGE 3B CHRONIC KIDNEY DISEASE, WITHOUT LONG-TERM CURRENT USE OF INSULIN (HCC): ICD-10-CM

## 2023-09-25 DIAGNOSIS — Z00.00 INITIAL MEDICARE ANNUAL WELLNESS VISIT: Primary | ICD-10-CM

## 2023-09-25 DIAGNOSIS — N18.32 STAGE 3B CHRONIC KIDNEY DISEASE (HCC): ICD-10-CM

## 2023-09-25 DIAGNOSIS — E11.22 TYPE 2 DIABETES MELLITUS WITH STAGE 3B CHRONIC KIDNEY DISEASE, WITHOUT LONG-TERM CURRENT USE OF INSULIN (HCC): ICD-10-CM

## 2023-09-25 DIAGNOSIS — Z23 IMMUNIZATION DUE: ICD-10-CM

## 2023-09-25 DIAGNOSIS — E66.01 SEVERE OBESITY (BMI 35.0-39.9) WITH COMORBIDITY (HCC): ICD-10-CM

## 2023-09-25 DIAGNOSIS — Z12.11 SCREEN FOR COLON CANCER: ICD-10-CM

## 2023-09-25 PROBLEM — E11.9 TYPE 2 DIABETES MELLITUS WITHOUT COMPLICATION, WITHOUT LONG-TERM CURRENT USE OF INSULIN (HCC): Status: RESOLVED | Noted: 2019-09-16 | Resolved: 2023-09-25

## 2023-09-25 LAB
ABSOLUTE IMMATURE GRANULOCYTE: <0.03 K/UL (ref 0–0.58)
ALBUMIN SERPL-MCNC: 3.8 G/DL (ref 3.5–5.2)
ALP BLD-CCNC: 98 U/L (ref 35–104)
ALT SERPL-CCNC: 17 U/L (ref 0–32)
ANION GAP SERPL CALCULATED.3IONS-SCNC: 13 MMOL/L (ref 7–16)
AST SERPL-CCNC: 24 U/L (ref 0–31)
BACTERIA: ABNORMAL
BASOPHILS ABSOLUTE: 0.03 K/UL (ref 0–0.2)
BASOPHILS RELATIVE PERCENT: 1 % (ref 0–2)
BILIRUB SERPL-MCNC: 0.2 MG/DL (ref 0–1.2)
BILIRUBIN URINE: NEGATIVE
BUN BLDV-MCNC: 20 MG/DL (ref 6–23)
CALCIUM SERPL-MCNC: 9 MG/DL (ref 8.6–10.2)
CHLORIDE BLD-SCNC: 105 MMOL/L (ref 98–107)
CHOLESTEROL: 202 MG/DL
CO2: 25 MMOL/L (ref 22–29)
COLOR: YELLOW
CREAT SERPL-MCNC: 1 MG/DL (ref 0.5–1)
CREATININE URINE: 76.5 MG/DL (ref 29–226)
EOSINOPHILS ABSOLUTE: 0.1 K/UL (ref 0.05–0.5)
EOSINOPHILS RELATIVE PERCENT: 2 % (ref 0–6)
GFR SERPL CREATININE-BSD FRML MDRD: 58 ML/MIN/1.73M2
GLUCOSE BLD-MCNC: 120 MG/DL (ref 74–99)
GLUCOSE URINE: NEGATIVE MG/DL
HBA1C MFR BLD: 5.9 % (ref 4–5.6)
HCT VFR BLD CALC: 37.9 % (ref 34–48)
HDLC SERPL-MCNC: 53 MG/DL
HEMOGLOBIN: 11.7 G/DL (ref 11.5–15.5)
IMMATURE GRANULOCYTES: 0 % (ref 0–5)
KETONES, URINE: NEGATIVE MG/DL
LDL CHOLESTEROL: 133 MG/DL
LEUKOCYTE ESTERASE, URINE: ABNORMAL
LYMPHOCYTES ABSOLUTE: 1.4 K/UL (ref 1.5–4)
LYMPHOCYTES RELATIVE PERCENT: 31 % (ref 20–42)
MCH RBC QN AUTO: 30.5 PG (ref 26–35)
MCHC RBC AUTO-ENTMCNC: 30.9 G/DL (ref 32–34.5)
MCV RBC AUTO: 99 FL (ref 80–99.9)
MICROALBUMIN/CREAT 24H UR: 19 MG/L (ref 0–19)
MICROALBUMIN/CREAT UR-RTO: 25 MCG/MG CREAT (ref 0–30)
MONOCYTES ABSOLUTE: 0.32 K/UL (ref 0.1–0.95)
MONOCYTES RELATIVE PERCENT: 7 % (ref 2–12)
NEUTROPHILS ABSOLUTE: 2.66 K/UL (ref 1.8–7.3)
NEUTROPHILS RELATIVE PERCENT: 59 % (ref 43–80)
NITRITE, URINE: POSITIVE
PDW BLD-RTO: 14.4 % (ref 11.5–15)
PH UA: 6 (ref 5–9)
PLATELET # BLD: 239 K/UL (ref 130–450)
PMV BLD AUTO: 10.1 FL (ref 7–12)
POTASSIUM SERPL-SCNC: 4.7 MMOL/L (ref 3.5–5)
PROTEIN UA: NEGATIVE MG/DL
RBC # BLD: 3.83 M/UL (ref 3.5–5.5)
RBC UA: ABNORMAL /HPF
SODIUM BLD-SCNC: 143 MMOL/L (ref 132–146)
SPECIFIC GRAVITY UA: 1.02 (ref 1–1.03)
TOTAL PROTEIN: 6.7 G/DL (ref 6.4–8.3)
TRIGL SERPL-MCNC: 79 MG/DL
TSH SERPL DL<=0.05 MIU/L-ACNC: 1.03 UIU/ML (ref 0.27–4.2)
TURBIDITY: ABNORMAL
URINE HGB: ABNORMAL
UROBILINOGEN, URINE: 0.2 EU/DL (ref 0–1)
VLDLC SERPL CALC-MCNC: 16 MG/DL
WBC # BLD: 4.5 K/UL (ref 4.5–11.5)
WBC UA: ABNORMAL /HPF

## 2023-09-25 PROCEDURE — 90694 VACC AIIV4 NO PRSRV 0.5ML IM: CPT | Performed by: FAMILY MEDICINE

## 2023-09-25 PROCEDURE — G0008 ADMIN INFLUENZA VIRUS VAC: HCPCS | Performed by: FAMILY MEDICINE

## 2023-09-25 PROCEDURE — 1123F ACP DISCUSS/DSCN MKR DOCD: CPT | Performed by: FAMILY MEDICINE

## 2023-09-25 PROCEDURE — G0438 PPPS, INITIAL VISIT: HCPCS | Performed by: FAMILY MEDICINE

## 2023-09-25 PROCEDURE — 93000 ELECTROCARDIOGRAM COMPLETE: CPT | Performed by: FAMILY MEDICINE

## 2023-09-25 SDOH — ECONOMIC STABILITY: FOOD INSECURITY: WITHIN THE PAST 12 MONTHS, THE FOOD YOU BOUGHT JUST DIDN'T LAST AND YOU DIDN'T HAVE MONEY TO GET MORE.: NEVER TRUE

## 2023-09-25 SDOH — ECONOMIC STABILITY: INCOME INSECURITY: HOW HARD IS IT FOR YOU TO PAY FOR THE VERY BASICS LIKE FOOD, HOUSING, MEDICAL CARE, AND HEATING?: NOT HARD AT ALL

## 2023-09-25 SDOH — ECONOMIC STABILITY: HOUSING INSECURITY
IN THE LAST 12 MONTHS, WAS THERE A TIME WHEN YOU DID NOT HAVE A STEADY PLACE TO SLEEP OR SLEPT IN A SHELTER (INCLUDING NOW)?: NO

## 2023-09-25 SDOH — ECONOMIC STABILITY: FOOD INSECURITY: WITHIN THE PAST 12 MONTHS, YOU WORRIED THAT YOUR FOOD WOULD RUN OUT BEFORE YOU GOT MONEY TO BUY MORE.: NEVER TRUE

## 2023-09-25 ASSESSMENT — ENCOUNTER SYMPTOMS
CONSTIPATION: 0
EYES NEGATIVE: 1
SHORTNESS OF BREATH: 0
PHOTOPHOBIA: 0
ABDOMINAL PAIN: 0
DIARRHEA: 0
CHEST TIGHTNESS: 0
EYE REDNESS: 0
WHEEZING: 0
VOMITING: 0
BLOOD IN STOOL: 0
COUGH: 0

## 2023-09-25 ASSESSMENT — LIFESTYLE VARIABLES
HOW OFTEN DO YOU HAVE A DRINK CONTAINING ALCOHOL: MONTHLY OR LESS
HOW MANY STANDARD DRINKS CONTAINING ALCOHOL DO YOU HAVE ON A TYPICAL DAY: 1 OR 2

## 2023-09-25 ASSESSMENT — PATIENT HEALTH QUESTIONNAIRE - PHQ9
2. FEELING DOWN, DEPRESSED OR HOPELESS: 0
1. LITTLE INTEREST OR PLEASURE IN DOING THINGS: 0
SUM OF ALL RESPONSES TO PHQ QUESTIONS 1-9: 0
SUM OF ALL RESPONSES TO PHQ QUESTIONS 1-9: 0
SUM OF ALL RESPONSES TO PHQ9 QUESTIONS 1 & 2: 0
SUM OF ALL RESPONSES TO PHQ QUESTIONS 1-9: 0
SUM OF ALL RESPONSES TO PHQ QUESTIONS 1-9: 0

## 2023-09-25 NOTE — PROGRESS NOTES
Medicare Annual Wellness Visit    Rogerio James is here for Medicare AWV and Knee Pain (Right knee, more of an ache x 1 month )    Assessment & Plan   Initial Medicare annual wellness visit  Severe obesity (BMI 35.0-39. 9) with comorbidity ([address])  Type 2 diabetes mellitus with stage 3b chronic kidney disease, without long-term current use of insulin (HCC)  -     CBC with Auto Differential; Future  -     Comprehensive Metabolic Panel; Future  -     Lipid Panel; Future  -     Hemoglobin A1C; Future  -     Microalbumin, Ur; Future  -     Urinalysis; Future  -     EKG 12 Lead; Future  Stage 3b chronic kidney disease (HCC)  -     CBC with Auto Differential; Future  -     Comprehensive Metabolic Panel; Future  -     Urinalysis; Future  -     EKG 12 Lead; Future  Other hyperlipidemia  -     Lipid Panel; Future  -     TSH; Future  -     EKG 12 Lead; Future  Immunization due  -     Influenza, FLUAD, (age 72 y+), IM, Preservative Free, 0.5 mL  Screen for colon cancer  -     Fecal DNA Colorectal cancer screening (Cologuard)  Recommendations for Preventive Services Due: see orders and patient instructions/AVS.  Recommended screening schedule for the next 5-10 years is provided to the patient in written form: see Patient Instructions/AVS.     Return in 6 months (on 3/25/2024) for Medicare Annual Wellness Visit in 1 year. Subjective   The following acute and/or chronic problems were also addressed today:  See OV note  Patient's complete Health Risk Assessment and screening values have been reviewed and are found in Flowsheets. The following problems were reviewed today and where indicated follow up appointments were made and/or referrals ordered.     Positive Risk Factor Screenings with Interventions:                 Weight and Activity:  Physical Activity: Insufficiently Active (9/25/2023)    Exercise Vital Sign     Days of Exercise per Week: 2 days     Minutes of Exercise per Session: 20 min     On average, how many days

## 2024-01-20 DIAGNOSIS — E11.9 TYPE 2 DIABETES MELLITUS WITHOUT COMPLICATION, WITHOUT LONG-TERM CURRENT USE OF INSULIN (HCC): ICD-10-CM

## 2024-01-22 RX ORDER — PIOGLITAZONEHYDROCHLORIDE 30 MG/1
30 TABLET ORAL DAILY
Qty: 100 TABLET | Refills: 1 | Status: SHIPPED | OUTPATIENT
Start: 2024-01-22

## 2024-01-22 NOTE — TELEPHONE ENCOUNTER
Last Appointment:  9/25/2023  Future Appointments   Date Time Provider Department Center   9/26/2024  9:30 AM Ephraim Cho MD [facility]

## 2024-03-20 ENCOUNTER — TELEPHONE (OUTPATIENT)
Dept: PHARMACY | Facility: CLINIC | Age: 84
End: 2024-03-20

## 2024-03-20 NOTE — TELEPHONE ENCOUNTER
[facility] CLINICAL PHARMACY: ADHERENCE REVIEW  Identified care gap per [employer]: fills at [facility]: ACE/ARB adherence    Patient also appears to be prescribed: Diabetes ( 90 days filled through [facility])    ASSESSMENT    ACE/ARB ADHERENCE    Insurance Records claims through 2024 (Prior Year PDC = not reported; YTD PDC = FIRST FILL; Potential Fail Date: 24):     Lisinopril 20mg last filled on 24 for 30 day supply. Next refill due: 24    Prescribed si tablet/capsule daily    Per Insurer Portal: same as above.     BP Readings from Last 3 Encounters:   23 138/82   22 138/82   22 130/82     Estimated Creatinine Clearance: 47 mL/min (based on SCr of 1 mg/dL).  Lab Results   Component Value Date    CREATININE 1.0 2023     Lab Results   Component Value Date    K 4.7 2023       PLAN  Per insurer report, LIS-0 - co-pays are based on tiers and patient is subject to coverage gap.    The following are interventions that have been identified:   Patient overdue refilling Lisinopril. Unsure if patient is still prescribed this medication.     Reached patient to review.  Got in touch with patient. She said \" no I am not taking right now\" then must have hung up or we got disconnected? Tried calling back no answer. It is not in her active medications on epic. A [place] doctor put her on the lisinopril after a surgery in January.      Last Visit: 23  Next Visit: none  Recent Visits  Date Type Provider Dept   23 Office Visit Ephraim Cho MD [facility]   22 Office Visit Ephraim Cho MD [facility]   Showing recent visits within past 540 days with a meds authorizing provider and meeting all other requirements  Future Appointments  No visits were found meeting these conditions.  Showing future appointments within next 150 days with a meds authorizing provider and meeting all other requirements    Future Appointments   Date Time Provider

## 2024-04-25 ENCOUNTER — TELEPHONE (OUTPATIENT)
Dept: PHARMACY | Facility: CLINIC | Age: 84
End: 2024-04-25

## 2024-04-25 NOTE — TELEPHONE ENCOUNTER
[facility] CLINICAL PHARMACY: ADHERENCE REVIEW  Identified care gap per [employer]: fills at [facility]: ACE/ARB and Diabetes adherence    ASSESSMENT    ACE/ARB ADHERENCE    Insurance Records claims through 2024 (Prior Year PDC = not reported; YTD PDC = FIRST FILL; Potential Fail Date: 24):     Lisinopril 20mg last filled on 24 for 30 day supply. Next refill due: 24    Prescribed si tablet/capsule daily    Per Insurer Portal: same as above.    Not on medication list in epic.    BP Readings from Last 3 Encounters:   23 138/82   22 138/82   22 130/82     CrCl cannot be calculated (Patient's most recent lab result is older than the maximum 180 days allowed.).  Lab Results   Component Value Date    CREATININE 1.0 2023     Lab Results   Component Value Date    K 4.7 2023     DIABETES ADHERENCE    Insurance Records claims through 2024 (Prior Year PDC = 85% - PASSED ; YTD PDC = FIRST FILL; Potential Fail Date: 24):     pioglitazone last filled on 24 for 100 day supply. Next refill due: 24    Prescribed si tablet/capsule daily    Per Insurer Portal: same as above.    Per [facility]  Pharmacy:  1 refills remaining.    Lab Results   Component Value Date    LABA1C 5.9 (H) 2023    LABA1C 6.6 (H) 2022    LABA1C 7.5 (H) 06/10/2022     NOTE: A1c >9%    PLAN  Per insurer report, LIS-0 - co-pays are based on tiers and patient is subject to coverage gap.    The following are interventions that have been identified:   Patient overdue refilling lisinopril 20mg. Unsure if patient is still prescribed this medication.  Per 24 encounter with [facility] patient was prescribed lisinopril 20mg after her hospital stay. Need to verify if they wanted her to continue on daily, if so need a new prescription.     Attempting to reach patient to review.  Left message asking for return call. Letter sent to patient.      Last Visit: 23  Next Visit:

## 2024-06-29 DIAGNOSIS — E11.9 TYPE 2 DIABETES MELLITUS WITHOUT COMPLICATION, WITHOUT LONG-TERM CURRENT USE OF INSULIN (HCC): ICD-10-CM

## 2024-07-01 RX ORDER — PIOGLITAZONEHYDROCHLORIDE 30 MG/1
30 TABLET ORAL DAILY
Qty: 100 TABLET | Refills: 1 | Status: SHIPPED | OUTPATIENT
Start: 2024-07-01

## 2024-07-01 NOTE — TELEPHONE ENCOUNTER
Last Appointment:  9/25/2023  Future Appointments   Date Time Provider Department Center   9/26/2024  9:30 AM Ephraim Cho MD [facility]

## 2024-09-26 ENCOUNTER — OFFICE VISIT (OUTPATIENT)
Dept: FAMILY MEDICINE CLINIC | Age: 84
End: 2024-09-26

## 2024-09-26 VITALS
OXYGEN SATURATION: 98 % | BODY MASS INDEX: 37.49 KG/M2 | HEIGHT: 63 IN | WEIGHT: 211.6 LBS | DIASTOLIC BLOOD PRESSURE: 82 MMHG | HEART RATE: 81 BPM | TEMPERATURE: 98 F | RESPIRATION RATE: 17 BRPM | SYSTOLIC BLOOD PRESSURE: 136 MMHG

## 2024-09-26 DIAGNOSIS — Z12.31 ENCOUNTER FOR SCREENING MAMMOGRAM FOR MALIGNANT NEOPLASM OF BREAST: ICD-10-CM

## 2024-09-26 DIAGNOSIS — Z00.00 MEDICARE ANNUAL WELLNESS VISIT, SUBSEQUENT: Primary | ICD-10-CM

## 2024-09-26 DIAGNOSIS — E11.22 TYPE 2 DIABETES MELLITUS WITH STAGE 3B CHRONIC KIDNEY DISEASE, WITHOUT LONG-TERM CURRENT USE OF INSULIN (HCC): ICD-10-CM

## 2024-09-26 DIAGNOSIS — E78.49 OTHER HYPERLIPIDEMIA: ICD-10-CM

## 2024-09-26 DIAGNOSIS — N18.32 TYPE 2 DIABETES MELLITUS WITH STAGE 3B CHRONIC KIDNEY DISEASE, WITHOUT LONG-TERM CURRENT USE OF INSULIN (HCC): ICD-10-CM

## 2024-09-26 DIAGNOSIS — E66.01 SEVERE OBESITY (BMI 35.0-39.9) WITH COMORBIDITY: ICD-10-CM

## 2024-09-26 LAB
ALBUMIN: 3.9 G/DL (ref 3.5–5.2)
ALP BLD-CCNC: 129 U/L (ref 35–104)
ALT SERPL-CCNC: 14 U/L (ref 0–32)
ANION GAP SERPL CALCULATED.3IONS-SCNC: 12 MMOL/L (ref 7–16)
AST SERPL-CCNC: 23 U/L (ref 0–31)
BASOPHILS ABSOLUTE: 0.04 K/UL (ref 0–0.2)
BASOPHILS RELATIVE PERCENT: 1 % (ref 0–2)
BILIRUB SERPL-MCNC: 0.3 MG/DL (ref 0–1.2)
BUN BLDV-MCNC: 23 MG/DL (ref 6–23)
CALCIUM SERPL-MCNC: 9.5 MG/DL (ref 8.6–10.2)
CHLORIDE BLD-SCNC: 101 MMOL/L (ref 98–107)
CHOLESTEROL, TOTAL: 203 MG/DL
CO2: 27 MMOL/L (ref 22–29)
CREAT SERPL-MCNC: 1 MG/DL (ref 0.5–1)
EOSINOPHILS ABSOLUTE: 0.09 K/UL (ref 0.05–0.5)
EOSINOPHILS RELATIVE PERCENT: 2 % (ref 0–6)
GFR, ESTIMATED: 57 ML/MIN/1.73M2
GLUCOSE BLD-MCNC: 125 MG/DL (ref 74–99)
HBA1C MFR BLD: 6.5 % (ref 4–5.6)
HCT VFR BLD CALC: 39.8 % (ref 34–48)
HDLC SERPL-MCNC: 56 MG/DL
HEMOGLOBIN: 12.3 G/DL (ref 11.5–15.5)
IMMATURE GRANULOCYTES %: 0 % (ref 0–5)
IMMATURE GRANULOCYTES ABSOLUTE: <0.03 K/UL (ref 0–0.58)
LDL CHOLESTEROL: 135 MG/DL
LYMPHOCYTES ABSOLUTE: 1.54 K/UL (ref 1.5–4)
LYMPHOCYTES RELATIVE PERCENT: 32 % (ref 20–42)
MCH RBC QN AUTO: 30.1 PG (ref 26–35)
MCHC RBC AUTO-ENTMCNC: 30.9 G/DL (ref 32–34.5)
MCV RBC AUTO: 97.5 FL (ref 80–99.9)
MONOCYTES ABSOLUTE: 0.36 K/UL (ref 0.1–0.95)
MONOCYTES RELATIVE PERCENT: 7 % (ref 2–12)
NEUTROPHILS ABSOLUTE: 2.84 K/UL (ref 1.8–7.3)
NEUTROPHILS RELATIVE PERCENT: 58 % (ref 43–80)
PDW BLD-RTO: 13.7 % (ref 11.5–15)
PLATELET # BLD: 296 K/UL (ref 130–450)
PMV BLD AUTO: 10.1 FL (ref 7–12)
POTASSIUM SERPL-SCNC: 5.4 MMOL/L (ref 3.5–5)
RBC # BLD: 4.08 M/UL (ref 3.5–5.5)
SODIUM BLD-SCNC: 140 MMOL/L (ref 132–146)
TOTAL PROTEIN: 7.4 G/DL (ref 6.4–8.3)
TRIGL SERPL-MCNC: 61 MG/DL
TSH SERPL DL<=0.05 MIU/L-ACNC: 1.18 UIU/ML (ref 0.27–4.2)
VLDLC SERPL CALC-MCNC: 12 MG/DL
WBC # BLD: 4.9 K/UL (ref 4.5–11.5)

## 2024-09-26 RX ORDER — PIOGLITAZONEHYDROCHLORIDE 30 MG/1
30 TABLET ORAL DAILY
Qty: 100 TABLET | Refills: 3 | Status: SHIPPED | OUTPATIENT
Start: 2024-09-26

## 2024-09-26 SDOH — ECONOMIC STABILITY: FOOD INSECURITY: WITHIN THE PAST 12 MONTHS, YOU WORRIED THAT YOUR FOOD WOULD RUN OUT BEFORE YOU GOT MONEY TO BUY MORE.: NEVER TRUE

## 2024-09-26 SDOH — ECONOMIC STABILITY: FOOD INSECURITY: WITHIN THE PAST 12 MONTHS, THE FOOD YOU BOUGHT JUST DIDN'T LAST AND YOU DIDN'T HAVE MONEY TO GET MORE.: NEVER TRUE

## 2024-09-26 SDOH — ECONOMIC STABILITY: INCOME INSECURITY: HOW HARD IS IT FOR YOU TO PAY FOR THE VERY BASICS LIKE FOOD, HOUSING, MEDICAL CARE, AND HEATING?: NOT HARD AT ALL

## 2024-09-26 ASSESSMENT — ENCOUNTER SYMPTOMS
CHEST TIGHTNESS: 0
EYES NEGATIVE: 1
BLOOD IN STOOL: 0
RHINORRHEA: 1
EYE REDNESS: 0
SORE THROAT: 0
VOMITING: 0
SHORTNESS OF BREATH: 0
PHOTOPHOBIA: 0
ABDOMINAL PAIN: 0
WHEEZING: 0
COLOR CHANGE: 0
CONSTIPATION: 0
DIARRHEA: 0
COUGH: 0
TROUBLE SWALLOWING: 0

## 2024-09-26 ASSESSMENT — PATIENT HEALTH QUESTIONNAIRE - PHQ9
2. FEELING DOWN, DEPRESSED OR HOPELESS: NOT AT ALL
SUM OF ALL RESPONSES TO PHQ9 QUESTIONS 1 & 2: 0
SUM OF ALL RESPONSES TO PHQ QUESTIONS 1-9: 0
1. LITTLE INTEREST OR PLEASURE IN DOING THINGS: NOT AT ALL
SUM OF ALL RESPONSES TO PHQ QUESTIONS 1-9: 0

## 2024-09-26 ASSESSMENT — LIFESTYLE VARIABLES
HOW OFTEN DO YOU HAVE A DRINK CONTAINING ALCOHOL: NEVER
HOW MANY STANDARD DRINKS CONTAINING ALCOHOL DO YOU HAVE ON A TYPICAL DAY: PATIENT DOES NOT DRINK

## 2025-01-02 ENCOUNTER — OFFICE VISIT (OUTPATIENT)
Dept: FAMILY MEDICINE CLINIC | Age: 85
End: 2025-01-02

## 2025-01-02 VITALS
WEIGHT: 207 LBS | DIASTOLIC BLOOD PRESSURE: 84 MMHG | BODY MASS INDEX: 36.67 KG/M2 | SYSTOLIC BLOOD PRESSURE: 132 MMHG | HEART RATE: 91 BPM | RESPIRATION RATE: 18 BRPM | OXYGEN SATURATION: 98 % | TEMPERATURE: 98 F

## 2025-01-02 DIAGNOSIS — J40 BRONCHITIS: Primary | ICD-10-CM

## 2025-01-02 DIAGNOSIS — R06.2 WHEEZE: ICD-10-CM

## 2025-01-02 DIAGNOSIS — R05.1 ACUTE COUGH: ICD-10-CM

## 2025-01-02 RX ORDER — ALBUTEROL SULFATE 90 UG/1
2 INHALANT RESPIRATORY (INHALATION) 4 TIMES DAILY PRN
Qty: 18 G | Refills: 0 | Status: SHIPPED | OUTPATIENT
Start: 2025-01-02

## 2025-01-02 RX ORDER — PREDNISONE 10 MG/1
TABLET ORAL
Qty: 12 TABLET | Refills: 0 | Status: SHIPPED | OUTPATIENT
Start: 2025-01-02 | End: 2025-01-07

## 2025-01-02 RX ORDER — BENZONATATE 100 MG/1
100 CAPSULE ORAL 3 TIMES DAILY PRN
Qty: 30 CAPSULE | Refills: 0 | Status: SHIPPED | OUTPATIENT
Start: 2025-01-02 | End: 2025-01-12

## 2025-01-02 ASSESSMENT — ENCOUNTER SYMPTOMS
WHEEZING: 1
COUGH: 1
DIARRHEA: 0
ABDOMINAL PAIN: 0
VOMITING: 0
NAUSEA: 0

## 2025-01-03 NOTE — PROGRESS NOTES
[facility]     25  Elfego [place] : 1940 Sex: female  Age: 84 y.o.    Chief Complaint   Patient presents with    Cough     Sx 3-4d   Refused swabbing     Congestion       HPI  Patient presents to express care today accompanied by  complaining of cough, chest congestion over the last 3 to 4 days.  States she is not having shortness of breath.  Denies fever or chills.  Has had some mild wheezing.  Patient states they are leaving for [place] in the next couple days and wanted to be checked.          Review of Systems   Constitutional:  Negative for chills and fever.   HENT:  Positive for congestion.    Respiratory:  Positive for cough and wheezing.    Cardiovascular:  Negative for chest pain.   Gastrointestinal:  Negative for abdominal pain, diarrhea, nausea and vomiting.   Endocrine:        Type 2 diabetes   Musculoskeletal:  Negative for myalgias.   Neurological:  Negative for headaches.               REST OF PERTINENT ROS GONE OVER AND WAS NEGATIVE.               Current Outpatient Medications:     albuterol sulfate HFA (VENTOLIN HFA) 108 (90 Base) MCG/ACT inhaler, Inhale 2 puffs into the lungs 4 times daily as needed for Wheezing or Shortness of Breath, Disp: 18 g, Rfl: 0    predniSONE (DELTASONE) 10 MG tablet, Take 3 tablets by mouth daily for 2 days, THEN 2 tablets daily for 2 days, THEN 1 tablet daily for 2 days., Disp: 12 tablet, Rfl: 0    benzonatate (TESSALON) 100 MG capsule, Take 1 capsule by mouth 3 times daily as needed for Cough, Disp: 30 capsule, Rfl: 0    amoxicillin-clavulanate (AUGMENTIN) 875-125 MG per tablet, Take 1 tablet by mouth 2 times daily for 7 days, Disp: 14 tablet, Rfl: 0    pioglitazone (ACTOS) 30 MG tablet, Take 1 tablet by mouth daily, Disp: 100 tablet, Rfl: 3    Multiple Vitamin (MULTI-VITAMIN DAILY) TABS, Take 1 tablet by mouth daily, Disp: , Rfl:     aspirin 81 MG tablet, Take 1 tablet by mouth See Admin Instructions Take 1 tablet by mouth every

## 2025-01-22 ENCOUNTER — CARE COORDINATION (OUTPATIENT)
Dept: CARE COORDINATION | Age: 85
End: 2025-01-22

## 2025-01-22 NOTE — CARE COORDINATION
Ambulatory Care Coordination Note     1/22/2025 10:35 AM     Patient outreach attempt by this ACM today to offer care management services. ACM was unable to reach the patient by telephone today;   left voice message requesting a return phone call to this ACM.  letter mailed requesting patient  to contact this ACM.      ACM: Jose Luis Ndiaye RN     Care Summary Note: na    PCP/Specialist follow up:   Future Appointments         Provider Specialty Dept Phone    9/29/2025 9:30 AM Ephraim Cho MD Family Medicine [phone]            Follow Up:   Plan for next ACM outreach in approximately 1 week to complete:  - outreach attempt to offer care management services.

## 2025-02-12 ENCOUNTER — CARE COORDINATION (OUTPATIENT)
Dept: CARE COORDINATION | Age: 85
End: 2025-02-12

## 2025-02-12 NOTE — CARE COORDINATION
Ambulatory Care Coordination Note     2025 12:37 PM     Patient Current Location:  Home: [address]   [facility] [place] [address]     This patient was received as a referral from [facility] health report .    ACM contacted the patient by telephone. Verified name and  with patient as identifiers. Provided introduction to self, and explanation of the ACM role.   Patient declined care management services at this time.          ACM: Jose Luis Ndiaye RN

## 2025-04-14 LAB — DIABETIC RETINOPATHY: NEGATIVE
